# Patient Record
Sex: FEMALE | Race: WHITE | NOT HISPANIC OR LATINO | Employment: FULL TIME | URBAN - METROPOLITAN AREA
[De-identification: names, ages, dates, MRNs, and addresses within clinical notes are randomized per-mention and may not be internally consistent; named-entity substitution may affect disease eponyms.]

---

## 2023-02-23 ENCOUNTER — OFFICE VISIT (OUTPATIENT)
Dept: NEUROSURGERY | Facility: CLINIC | Age: 58
End: 2023-02-23

## 2023-02-23 ENCOUNTER — HOSPITAL ENCOUNTER (OUTPATIENT)
Dept: RADIOLOGY | Facility: HOSPITAL | Age: 58
Discharge: HOME | End: 2023-02-23
Attending: NEUROLOGICAL SURGERY

## 2023-02-23 VITALS
BODY MASS INDEX: 24.92 KG/M2 | WEIGHT: 132 LBS | HEIGHT: 61 IN | TEMPERATURE: 98.4 F | SYSTOLIC BLOOD PRESSURE: 110 MMHG | RESPIRATION RATE: 16 BRPM | HEART RATE: 78 BPM | DIASTOLIC BLOOD PRESSURE: 67 MMHG

## 2023-02-23 DIAGNOSIS — R07.81 RIB PAIN: ICD-10-CM

## 2023-02-23 DIAGNOSIS — M54.14 THORACIC RADICULOPATHY: ICD-10-CM

## 2023-02-23 DIAGNOSIS — M54.14 THORACIC RADICULOPATHY: Primary | ICD-10-CM

## 2023-02-23 RX ORDER — ROSUVASTATIN CALCIUM 5 MG/1
5 TABLET, COATED ORAL
COMMUNITY
Start: 2022-12-14

## 2023-02-23 RX ORDER — ZOLPIDEM TARTRATE 10 MG/1
10 TABLET ORAL DAILY PRN
COMMUNITY
Start: 2022-11-01

## 2023-02-23 RX ORDER — LISINOPRIL 10 MG/1
1 TABLET ORAL 2 TIMES DAILY
COMMUNITY
Start: 2022-09-27

## 2023-02-23 NOTE — PROGRESS NOTES
DISCUSSION SUMMARY  This is a 61 y o  female with a unprovoked pain in the thoracic spine with pain radiating from her back to her anterior chest   The pain has progressed over the last 2 to 3 months  The pain is without changes in color of her skin  She has never had skin eruptions in this region to suggest shingles  She does have a history of low back pain and sciatica she has used gabapentin which is not significantly improving the symptoms  The symptoms are mechanical in nature and that when she is standing the pain is less and when she is sitting the pain is much more severe  Yoga and stretching do seem to help the symptoms slightly  The pain can be reproduced by palpation over the rib at the lower thoracic spine  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 is for these reasons that I have recommended x-rays of the ribs as well as an MRI of the cervical spine  The exercises that she does and yoga are reminiscent of those that would have been done in physical therapy  Return for follow-up after test       Diagnosis ICD-10-CM Associated Orders   1  Thoracic radiculopathy  M54 14 MRI thoracic spine without contrast      2  Rib pain  R07 81            Chief Complaint   Patient presents with   • New Patient Visit     NP (self-referred) for sharp thoracic pain, no new imaging         HPI     Patient presents for evaluation of thoracic spine  • Symptoms include: mid (thoracic) back pain which is described as sharp and her pain travels towards the front to the abdomen and it becomes like a numbing pain  • She reports that symptoms have been present for several months  • Initial inciting event: none  • Symptoms are worst: end of the day (mainly 3-4 pm)  • Symptoms are relieved by rest, stretching and walking  • Symptoms are worsen by sitting  • Previous spinal problems: low back pain and sciatica pain but nothing significant     • Conservative measures tried: gabapentin (NEURONTIN) 300 mg Oral capsule take 1 Cap by mouth 3 times daily  Yoga and stretching every other day      • Prior surgery: none  Patient had prior imaging of her head neck and low back but never had study for her thoracic region to address this problem  Patient has developed pain in the thoracic spine which radiates anterior along the chest cage  She says that she is unable to sit because of the pain  She does have abdominal pain which appears to be coming from her back  She has numbness in this same distribution  She has tried gabapentin which was unhelpful for her  The pain does appear to be mechanical in nature  She has not noticed any changes of the skin coloration  She has had no skin eruptions in the region  She does feel that stretching and walking improves the symptomatology and sitting for long peers of time make it worse  She has tried yoga and stretching exercises which helped temporarily but the pain returns afterwards she does believe that the pain is increasing over the last several months    Review of Systems   Constitutional: Positive for activity change (unable to sit)  HENT: Negative  Eyes: Negative  Respiratory: Negative  Cardiovascular: Negative  Gastrointestinal: Positive for abdominal pain (coming from the mid back)  Endocrine: Negative  Genitourinary: Negative  Musculoskeletal: Positive for back pain (mid back)  Skin: Negative  Allergic/Immunologic: Negative  Neurological: Positive for numbness (in the abdomen with the pain that radiates from her back)  Hematological: Negative  Psychiatric/Behavioral: Negative  All other systems reviewed and are negative    I reviewed the ROS    Vitals:    /67 (BP Location: Right arm, Patient Position: Sitting, Cuff Size: Adult)   Pulse 78   Temp 98 4 °F (36 9 °C) (Temporal)   Resp 16   Ht 5' 1" (1 549 m)   Wt 59 9 kg (132 lb)   BMI 24 94 kg/m²     MEDICAL HISTORY  Past Medical History: Diagnosis Date   • Abdominal bloating    • Abnormal Pap smear of cervix    • Borderline hypertension    • Breast lipoma    • Dermatography    • GERD (gastroesophageal reflux disease)    • Gestational diabetes    • Hip pain    • HPV (human papilloma virus) anogenital infection    • Lumbar disc disease    • Undiagnosed cardiac murmurs      Past Surgical History:   Procedure Laterality Date   • ABDOMINOPLASTY     • BREAST BIOPSY Right    • BREAST LUMPECTOMY Right    •  SECTION     • HIP ARTHROPLASTY Left    • LIPOSUCTION Bilateral    • TOTAL HIP ARTHROPLASTY Left    • WISDOM TOOTH EXTRACTION       Social History     Tobacco Use   • Smoking status: Never   • Smokeless tobacco: Never   Substance Use Topics   • Alcohol use: Yes     Comment: social drinker   • Drug use: Never      Family History   Problem Relation Age of Onset   • Hypertension Mother    • Stroke Father    • Hypertension Father    • Ovarian cancer Paternal Aunt    • Heart disease Family         Current Outpatient Medications:   •  lisinopril (ZESTRIL) 10 mg tablet, Take 1 tablet by mouth 2 (two) times a day, Disp: , Rfl:   •  rosuvastatin (CRESTOR) 5 mg tablet, Take 5 mg by mouth, Disp: , Rfl:   •  zolpidem (AMBIEN) 10 mg tablet, Take 10 mg by mouth daily as needed, Disp: , Rfl:      No Known Allergies     The following portions of the patient's history were updated by MA and reviewed by MD: allergies, current medications, past family history, past medical history, past social history, past surgical history and problem list     Physical Exam  Vitals and nursing note reviewed  Constitutional:       General: She is not in acute distress  Appearance: Normal appearance  She is not ill-appearing, toxic-appearing or diaphoretic  HENT:      Head: Normocephalic  Nose: Nose normal    Eyes:      Extraocular Movements: Extraocular movements intact  Pupils: Pupils are equal, round, and reactive to light     Musculoskeletal:         General: No swelling, tenderness, deformity or signs of injury  Cervical back: Normal range of motion and neck supple  No rigidity  Right lower leg: No edema  Left lower leg: No edema  Comments: She has pain to palpation in the mid thoracic spine which recreates pain rating around her rib cage  Palpation of the rib cage also produces pain  There is no skin coloration or eruptions noted  Lymphadenopathy:      Cervical: No cervical adenopathy  Skin:     General: Skin is warm and dry  Coloration: Skin is not jaundiced  Findings: No erythema or rash  Neurological:      General: No focal deficit present  Mental Status: She is alert and oriented to person, place, and time  Cranial Nerves: No cranial nerve deficit  Sensory: No sensory deficit  Motor: No weakness  Coordination: Coordination normal       Gait: Gait normal       Deep Tendon Reflexes: Reflexes normal    Psychiatric:         Mood and Affect: Mood normal          Behavior: Behavior normal          Thought Content:  Thought content normal          Judgment: Judgment normal

## 2023-03-02 ENCOUNTER — TELEPHONE (OUTPATIENT)
Dept: NEUROSURGERY | Facility: CLINIC | Age: 58
End: 2023-03-02

## 2023-03-02 NOTE — TELEPHONE ENCOUNTER
3/2/23  SPOKE TO PT RE: MRI T SPINE DENIAL  NEEDS 6 WEEKS PHYSICAL THERAPY   SHE STATES SHE IS FEELING A LITTLE BETTER, DOING HER YOGA AND MAY SEE A CHIROPRACTOR  CX MRI ON PTS BEHALF ALONG WITH F/U WITH DKO  PT WILL CALL OFFICE IF NEEDED     PT WAS APPRECIATIVE OF THE CALL

## 2023-09-25 ENCOUNTER — OFFICE VISIT (OUTPATIENT)
Dept: INTERNAL MEDICINE CLINIC | Facility: CLINIC | Age: 58
End: 2023-09-25
Payer: COMMERCIAL

## 2023-09-25 VITALS
HEIGHT: 61 IN | BODY MASS INDEX: 24.92 KG/M2 | SYSTOLIC BLOOD PRESSURE: 125 MMHG | RESPIRATION RATE: 15 BRPM | WEIGHT: 132 LBS | DIASTOLIC BLOOD PRESSURE: 76 MMHG | HEART RATE: 77 BPM | OXYGEN SATURATION: 97 %

## 2023-09-25 DIAGNOSIS — R73.03 PREDIABETES: ICD-10-CM

## 2023-09-25 DIAGNOSIS — Z11.3 SCREENING EXAMINATION FOR STD (SEXUALLY TRANSMITTED DISEASE): ICD-10-CM

## 2023-09-25 DIAGNOSIS — E78.01 FAMILIAL HYPERCHOLESTEROLEMIA: Primary | ICD-10-CM

## 2023-09-25 DIAGNOSIS — Z12.31 ENCOUNTER FOR SCREENING MAMMOGRAM FOR MALIGNANT NEOPLASM OF BREAST: ICD-10-CM

## 2023-09-25 DIAGNOSIS — S90.811A ABRASION OF SKIN OF RIGHT FOOT: ICD-10-CM

## 2023-09-25 PROCEDURE — 90715 TDAP VACCINE 7 YRS/> IM: CPT

## 2023-09-25 PROCEDURE — 90471 IMMUNIZATION ADMIN: CPT

## 2023-09-25 PROCEDURE — 99203 OFFICE O/P NEW LOW 30 MIN: CPT | Performed by: INTERNAL MEDICINE

## 2023-09-25 RX ORDER — METHYLPREDNISOLONE 4 MG/1
TABLET ORAL
COMMUNITY
Start: 2023-06-27 | End: 2023-09-25 | Stop reason: ALTCHOICE

## 2023-09-25 NOTE — PROGRESS NOTES
Assessment/Plan:    #HTN  -BP stable on lisinopril  -has family history in parents  -will continue with low fat, low salt diet    #HLD  -lipids pending  -now on crestor  -is walking and doing yoga and keeping active    #Prediabetes  -previous a1c at 5.8  -has family history DM  -will trend a1c  -has been walking more and doing yoga    #Insomnia  -on ambien as needed and will continue  -has issues with staying asleep and waking up often  -reports snoring  -defers sleep study for now    #Thoracic Radiculopathy  -history of left sided rib and thoracic back pain  -xray ribs normal  -saw neurosurgery and stable  -treated with medrol dose and resolved  -holding off on MRI thoracic for now    #HPV Positive  -history of in the past  -will see gynecology  -is in a new relationship, will check STD panel    #Surgery  -left hip replacement and abdominoplasty, , and doing well    #Bunion  -noted over right foot  -has pain on occasion  -tries to wear comfortable footwear as much as possible    #Health Maintenance  -routine labs and followup 1 year  -is a pediatric anesthesiologist  -mammogram due next month  -gynecology due soon  -flu vaccine to obtain at work  -covid booster to obtain at pharmacy  -shingrix up to date  -TDAP today due to laceration to right foot  -colonoscopy due     No problem-specific Assessment & Plan notes found for this encounter.        Diagnoses and all orders for this visit:    Familial hypercholesterolemia  -     CBC and differential  -     Comprehensive metabolic panel  -     Lipid Panel with Direct LDL reflex    Prediabetes  -     CBC and differential  -     Comprehensive metabolic panel  -     Hemoglobin A1C    Screening examination for STD (sexually transmitted disease)  -     CBC and differential  -     Comprehensive metabolic panel  -     HIV 1/2 AB/AG w Reflex [facility] for 2 yr old and above  -     Chronic Hepatitis Panel  -     RPR-Syphilis Screening (Total Syphilis IGG/IGM)  - Chlamydia/GC amplified DNA by PCR    Abrasion of skin of right foot  -     CBC and differential  -     Comprehensive metabolic panel  -     TDAP VACCINE GREATER THAN OR EQUAL TO 8YO IM    Encounter for screening mammogram for malignant neoplasm of breast  -     Mammo screening bilateral w 3d & cad; Future    Other orders  -     Discontinue: methylPREDNISolone 4 MG tablet therapy pack            Current Outpatient Medications:   •  lisinopril (ZESTRIL) 10 mg tablet, Take 1 tablet by mouth 2 (two) times a day, Disp: , Rfl:   •  rosuvastatin (CRESTOR) 5 mg tablet, Take 5 mg by mouth, Disp: , Rfl:   •  zolpidem (AMBIEN) 10 mg tablet, Take 10 mg by mouth daily as needed, Disp: , Rfl:     Subjective:      Patient ID: Jes Tobin is a 60 y.o. female. HPI     Patient presents for new patient visit. Reports that she has a history of thoracic radiculopathy and chest pains however her x-ray was normal and her symptoms have resolved with a steroid pack. She is not having any issues at this time. She did follow-up with neurosurgery. An MRI was ordered but since her pain resolved she has deferred this. She is due for mammogram next month we gave her the referral.  She is also due for the Tdap vaccine. She has an abrasion to her right foot and we will provide her with the Tdap vaccine. She is in a new relationship and is interested in STD check. We will give her the paperwork for this. She is up-to-date on her colonoscopy in 2018 and will need a repeat in 10 years. She has a history of hyperlipidemia and remains on Crestor. She is also prediabetic and has been cutting back on carbohydrates and walking and doing yoga. She has a history of hypertension and remains on lisinopril. She has an abnormal Pap and follows up with gynecology and will see Dr. Shreyas Woodall soon. She drinks 1 glass of wine daily. She denies any smoking or drug use. She has insomnia and periodically uses Ambien.   She has a history of abdominoplasty as well as breast biopsy and  and a left hip replacement. Family history is positive for stroke and hypertension and hyperlipidemia and MI. She will return to care in 1 year. The following portions of the patient's history were reviewed and updated as appropriate: allergies, current medications, past family history, past medical history, past social history, past surgical history and problem list.    Review of Systems   Constitutional: Negative for activity change, appetite change, chills, fatigue and fever. HENT: Negative for congestion, ear pain, facial swelling, hearing loss, sore throat, tinnitus and trouble swallowing. Eyes: Negative for photophobia and visual disturbance. Respiratory: Negative for cough, shortness of breath and wheezing. Cardiovascular: Negative for chest pain, palpitations and leg swelling. Gastrointestinal: Negative for abdominal distention, abdominal pain, blood in stool, nausea and vomiting. Genitourinary: Negative for difficulty urinating, dysuria and pelvic pain. Musculoskeletal: Negative for arthralgias, back pain, gait problem, joint swelling, myalgias, neck pain and neck stiffness. Skin: Negative for rash and wound. Neurological: Negative for dizziness, tremors, light-headedness and headaches. Objective:      /76   Pulse 77   Resp 15   Ht 5' 1" (1.549 m)   Wt 59.9 kg (132 lb)   LMP  (LMP Unknown)   SpO2 97%   BMI 24.94 kg/m²          Physical Exam  Vitals reviewed. Constitutional:       Appearance: Normal appearance. She is well-developed. HENT:      Head: Normocephalic and atraumatic. Right Ear: Tympanic membrane, ear canal and external ear normal. There is no impacted cerumen. Left Ear: Tympanic membrane, ear canal and external ear normal. There is no impacted cerumen. Nose: Nose normal. No congestion. Mouth/Throat:      Pharynx: Oropharynx is clear.  No oropharyngeal exudate or posterior oropharyngeal erythema. Eyes:      Conjunctiva/sclera: Conjunctivae normal.      Pupils: Pupils are equal, round, and reactive to light. Neck:      Thyroid: No thyromegaly. Vascular: No JVD. Cardiovascular:      Rate and Rhythm: Normal rate and regular rhythm. Pulses: Normal pulses. Heart sounds: Normal heart sounds. No murmur heard. Pulmonary:      Effort: Pulmonary effort is normal. No respiratory distress. Breath sounds: Normal breath sounds. No stridor. No wheezing, rhonchi or rales. Abdominal:      General: Bowel sounds are normal. There is no distension. Palpations: Abdomen is soft. There is no mass. Tenderness: There is no abdominal tenderness. There is no right CVA tenderness, left CVA tenderness, guarding or rebound. Musculoskeletal:         General: Deformity (right knee scar, left hip replacement , right foot bunion) present. No tenderness. Normal range of motion. Cervical back: Normal range of motion and neck supple. No rigidity or tenderness. Right lower leg: No edema. Left lower leg: No edema. Lymphadenopathy:      Cervical: No cervical adenopathy. Skin:     General: Skin is warm and dry. Findings: No erythema or rash. Neurological:      Mental Status: She is alert and oriented to person, place, and time. Mental status is at baseline. Sensory: No sensory deficit. Deep Tendon Reflexes: Reflexes are normal and symmetric. Reflexes normal.   Psychiatric:         Mood and Affect: Mood normal.         Behavior: Behavior normal.         Thought Content: Thought content normal.         Judgment: Judgment normal.           This note was completed in part utilizing [employer] direct voice recognition software. Grammatical errors, random word insertion, spelling mistakes, and incomplete sentences may be an occasional consequence of the system secondary to software limitations, ambient noise and hardware issues.  At the time of dictation, efforts were made to edit, clarify and /or correct errors. Please read the chart carefully and recognize, using context, where substitutions have occurred. If you have any questions or concerns about the context, text or information contained within the body of this dictation, please contact myself, the provider, for further clarification.

## 2023-09-27 ENCOUNTER — APPOINTMENT (OUTPATIENT)
Dept: LAB | Facility: CLINIC | Age: 58
End: 2023-09-27
Payer: COMMERCIAL

## 2023-09-27 DIAGNOSIS — R73.03 PREDIABETES: ICD-10-CM

## 2023-09-27 DIAGNOSIS — E78.01 FAMILIAL HYPERCHOLESTEROLEMIA: Primary | ICD-10-CM

## 2023-09-27 LAB
ALBUMIN SERPL BCP-MCNC: 4.6 G/DL (ref 3.5–5)
ALP SERPL-CCNC: 53 U/L (ref 34–104)
ALT SERPL W P-5'-P-CCNC: 11 U/L (ref 7–52)
ANION GAP SERPL CALCULATED.3IONS-SCNC: 6 MMOL/L
AST SERPL W P-5'-P-CCNC: 14 U/L (ref 13–39)
BASOPHILS # BLD AUTO: 0.03 THOUSANDS/ÂΜL (ref 0–0.1)
BASOPHILS NFR BLD AUTO: 0 % (ref 0–1)
BILIRUB SERPL-MCNC: 0.42 MG/DL (ref 0.2–1)
BUN SERPL-MCNC: 13 MG/DL (ref 5–25)
CALCIUM SERPL-MCNC: 9.5 MG/DL (ref 8.4–10.2)
CHLORIDE SERPL-SCNC: 104 MMOL/L (ref 96–108)
CHOLEST SERPL-MCNC: 177 MG/DL
CO2 SERPL-SCNC: 28 MMOL/L (ref 21–32)
CREAT SERPL-MCNC: 0.71 MG/DL (ref 0.6–1.3)
EOSINOPHIL # BLD AUTO: 0.12 THOUSAND/ÂΜL (ref 0–0.61)
EOSINOPHIL NFR BLD AUTO: 2 % (ref 0–6)
ERYTHROCYTE [DISTWIDTH] IN BLOOD BY AUTOMATED COUNT: 15 % (ref 11.6–15.1)
EST. AVERAGE GLUCOSE BLD GHB EST-MCNC: 128 MG/DL
GFR SERPL CREATININE-BSD FRML MDRD: 93 ML/MIN/1.73SQ M
GLUCOSE P FAST SERPL-MCNC: 101 MG/DL (ref 65–99)
HBA1C MFR BLD: 6.1 %
HBV CORE AB SER QL: NORMAL
HBV CORE IGM SER QL: NORMAL
HBV SURFACE AG SER QL: NORMAL
HCT VFR BLD AUTO: 40.6 % (ref 34.8–46.1)
HCV AB SER QL: NORMAL
HDLC SERPL-MCNC: 58 MG/DL
HGB BLD-MCNC: 12.7 G/DL (ref 11.5–15.4)
HIV 1+2 AB+HIV1 P24 AG SERPL QL IA: NORMAL
HIV 2 AB SERPL QL IA: NORMAL
HIV1 AB SERPL QL IA: NORMAL
HIV1 P24 AG SERPL QL IA: NORMAL
IMM GRANULOCYTES # BLD AUTO: 0.02 THOUSAND/UL (ref 0–0.2)
IMM GRANULOCYTES NFR BLD AUTO: 0 % (ref 0–2)
LDLC SERPL CALC-MCNC: 95 MG/DL (ref 0–100)
LYMPHOCYTES # BLD AUTO: 2.73 THOUSANDS/ÂΜL (ref 0.6–4.47)
LYMPHOCYTES NFR BLD AUTO: 35 % (ref 14–44)
MCH RBC QN AUTO: 27.3 PG (ref 26.8–34.3)
MCHC RBC AUTO-ENTMCNC: 31.3 G/DL (ref 31.4–37.4)
MCV RBC AUTO: 87 FL (ref 82–98)
MONOCYTES # BLD AUTO: 0.66 THOUSAND/ÂΜL (ref 0.17–1.22)
MONOCYTES NFR BLD AUTO: 9 % (ref 4–12)
NEUTROPHILS # BLD AUTO: 4.17 THOUSANDS/ÂΜL (ref 1.85–7.62)
NEUTS SEG NFR BLD AUTO: 54 % (ref 43–75)
NRBC BLD AUTO-RTO: 0 /100 WBCS
PLATELET # BLD AUTO: 230 THOUSANDS/UL (ref 149–390)
PMV BLD AUTO: 10.4 FL (ref 8.9–12.7)
POTASSIUM SERPL-SCNC: 4 MMOL/L (ref 3.5–5.3)
PROT SERPL-MCNC: 7.1 G/DL (ref 6.4–8.4)
RBC # BLD AUTO: 4.65 MILLION/UL (ref 3.81–5.12)
SODIUM SERPL-SCNC: 138 MMOL/L (ref 135–147)
TREPONEMA PALLIDUM IGG+IGM AB [PRESENCE] IN SERUM OR PLASMA BY IMMUNOASSAY: NORMAL
TRIGL SERPL-MCNC: 118 MG/DL
WBC # BLD AUTO: 7.73 THOUSAND/UL (ref 4.31–10.16)

## 2023-09-27 PROCEDURE — 86803 HEPATITIS C AB TEST: CPT | Performed by: INTERNAL MEDICINE

## 2023-09-27 PROCEDURE — 87340 HEPATITIS B SURFACE AG IA: CPT | Performed by: INTERNAL MEDICINE

## 2023-09-27 PROCEDURE — 86705 HEP B CORE ANTIBODY IGM: CPT | Performed by: INTERNAL MEDICINE

## 2023-09-27 PROCEDURE — 85025 COMPLETE CBC W/AUTO DIFF WBC: CPT | Performed by: INTERNAL MEDICINE

## 2023-09-27 PROCEDURE — 80061 LIPID PANEL: CPT | Performed by: INTERNAL MEDICINE

## 2023-09-27 PROCEDURE — 36415 COLL VENOUS BLD VENIPUNCTURE: CPT | Performed by: INTERNAL MEDICINE

## 2023-09-27 PROCEDURE — 87389 HIV-1 AG W/HIV-1&-2 AB AG IA: CPT | Performed by: INTERNAL MEDICINE

## 2023-09-27 PROCEDURE — 80053 COMPREHEN METABOLIC PANEL: CPT | Performed by: INTERNAL MEDICINE

## 2023-09-27 PROCEDURE — 83036 HEMOGLOBIN GLYCOSYLATED A1C: CPT | Performed by: INTERNAL MEDICINE

## 2023-09-27 PROCEDURE — 86780 TREPONEMA PALLIDUM: CPT | Performed by: INTERNAL MEDICINE

## 2023-09-27 PROCEDURE — 87591 N.GONORRHOEAE DNA AMP PROB: CPT | Performed by: INTERNAL MEDICINE

## 2023-09-27 PROCEDURE — 86704 HEP B CORE ANTIBODY TOTAL: CPT | Performed by: INTERNAL MEDICINE

## 2023-09-27 PROCEDURE — 87491 CHLMYD TRACH DNA AMP PROBE: CPT | Performed by: INTERNAL MEDICINE

## 2023-09-28 LAB
C TRACH DNA SPEC QL NAA+PROBE: NEGATIVE
N GONORRHOEA DNA SPEC QL NAA+PROBE: NEGATIVE

## 2023-12-26 ENCOUNTER — ANNUAL EXAM (OUTPATIENT)
Dept: OBGYN CLINIC | Facility: CLINIC | Age: 58
End: 2023-12-26
Payer: COMMERCIAL

## 2023-12-26 VITALS
BODY MASS INDEX: 24.58 KG/M2 | DIASTOLIC BLOOD PRESSURE: 82 MMHG | SYSTOLIC BLOOD PRESSURE: 140 MMHG | HEIGHT: 61 IN | WEIGHT: 130.2 LBS

## 2023-12-26 DIAGNOSIS — Z01.419 ENCOUNTER FOR ANNUAL ROUTINE GYNECOLOGICAL EXAMINATION: Primary | ICD-10-CM

## 2023-12-26 DIAGNOSIS — Z12.31 ENCOUNTER FOR SCREENING MAMMOGRAM FOR MALIGNANT NEOPLASM OF BREAST: ICD-10-CM

## 2023-12-26 PROCEDURE — G0476 HPV COMBO ASSAY CA SCREEN: HCPCS | Performed by: OBSTETRICS & GYNECOLOGY

## 2023-12-26 PROCEDURE — S0610 ANNUAL GYNECOLOGICAL EXAMINA: HCPCS | Performed by: OBSTETRICS & GYNECOLOGY

## 2023-12-26 NOTE — PROGRESS NOTES
Assessment/Plan:  Pap smear done as well as annual.  Encouraged self breast examination as well as calcium supplementation.  Continue annual mammogram.  Reviewed colon cancer screening, up-to-date.  Discussed over-the-counter agents including vaginal moisturizers/lubricant.  All questions answered.  She will continue to follow-up with primary care as scheduled.  Return to office in 1 year or as needed  No problem-specific Assessment & Plan notes found for this encounter.       Diagnoses and all orders for this visit:    Encounter for annual routine gynecological examination  -     Liquid-based pap, screening    Encounter for screening mammogram for malignant neoplasm of breast          Subjective:      Patient ID: Michelle Cohn is a 58 y.o. female.    HPI    This is a very pleasant 58-year-old female P1 ( x 1, age 22) presents as a new patient for GYN exam.  Last GYN exam was approximately a year ago.  She went through menopause at age 49.  She is never been on hormone replacement therapy.  She rarely gets hot flashes/night sweats.  There has been no vaginal bleeding or spotting.  No changes in bowel or bladder function.  She is sexually active and has been in a monogamous relationship for 1.5 years.    Last Pap smear  normal, negative HPV.  She did have a history of cervical dysplasia WILBER-1 HPV where colposcopy was done .    Colonoscopy 2018 normal follow-up 10 years  Scheduled for mammogram    She continues to follow-up with primary care for hypertension and hyperlipidemia.    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    Review of Systems   Constitutional:  Negative for fatigue, fever and unexpected weight change.   Respiratory:  Negative for cough, chest  "tightness, shortness of breath and wheezing.    Cardiovascular: Negative.  Negative for chest pain and palpitations.   Gastrointestinal: Negative.  Negative for abdominal distention, abdominal pain, blood in stool, constipation, diarrhea, nausea and vomiting.   Genitourinary: Negative.  Negative for difficulty urinating, dyspareunia, dysuria, flank pain, frequency, genital sores, hematuria, pelvic pain, urgency, vaginal bleeding, vaginal discharge and vaginal pain.   Skin:  Negative for rash.         Objective:      /82   Ht 5' 1\" (1.549 m)   Wt 59.1 kg (130 lb 3.2 oz)   LMP  (LMP Unknown)   BMI 24.60 kg/m²          Physical Exam  Constitutional:       Appearance: Normal appearance. She is well-developed.   HENT:      Head: Normocephalic and atraumatic.   Cardiovascular:      Rate and Rhythm: Normal rate and regular rhythm.   Pulmonary:      Effort: Pulmonary effort is normal.      Breath sounds: Normal breath sounds.   Chest:   Breasts:     Right: No inverted nipple, mass, nipple discharge, skin change or tenderness.      Left: No inverted nipple, mass, nipple discharge, skin change or tenderness.   Abdominal:      General: Bowel sounds are normal. There is no distension.      Palpations: Abdomen is soft.      Tenderness: There is no abdominal tenderness. There is no guarding or rebound.   Genitourinary:     Labia:         Right: No rash, tenderness or lesion.         Left: No rash, tenderness or lesion.       Vagina: Normal. No signs of injury. No vaginal discharge or tenderness.      Cervix: No cervical motion tenderness, discharge, friability, lesion or cervical bleeding.      Uterus: Not enlarged and not tender.       Adnexa:         Right: No mass, tenderness or fullness.          Left: No mass, tenderness or fullness.     Neurological:      Mental Status: She is alert.   Psychiatric:         Behavior: Behavior normal.      Abdominal exam, history of abdominoplasty, primary   External " genitalia is within normal limits.  Is evident of slight estrogen deficiency.  Cervix is small the os is stenotic.  No pelvic floor prolapse.

## 2023-12-27 LAB
HPV HR 12 DNA CVX QL NAA+PROBE: NEGATIVE
HPV16 DNA CVX QL NAA+PROBE: NEGATIVE
HPV18 DNA CVX QL NAA+PROBE: NEGATIVE

## 2024-01-08 LAB
LAB AP GYN PRIMARY INTERPRETATION: NORMAL
Lab: NORMAL
PATH INTERP SPEC-IMP: NORMAL

## 2024-01-09 ENCOUNTER — TELEPHONE (OUTPATIENT)
Dept: OBGYN CLINIC | Facility: CLINIC | Age: 59
End: 2024-01-09

## 2024-01-09 DIAGNOSIS — N76.0 BV (BACTERIAL VAGINOSIS): Primary | ICD-10-CM

## 2024-01-09 DIAGNOSIS — B96.89 BV (BACTERIAL VAGINOSIS): Primary | ICD-10-CM

## 2024-01-09 RX ORDER — METRONIDAZOLE 500 MG/1
500 TABLET ORAL EVERY 12 HOURS SCHEDULED
Qty: 14 TABLET | Refills: 0 | Status: SHIPPED | OUTPATIENT
Start: 2024-01-09 | End: 2024-01-16

## 2024-01-09 NOTE — TELEPHONE ENCOUNTER
----- Message from Janette Breen DO sent at 1/9/2024  7:32 AM EST -----  Rhea, Please call the patient regarding her abnormal result. Patient is anesthesiologist on staff.  Please inform Pap normal but positive BV, recommend Flagyl twice daily x 7 days

## 2024-01-09 NOTE — TELEPHONE ENCOUNTER
Patient informed of pap results 12/26/2023 & recommendation to take Flagyl with precautions given/KA.  Please sign off on prescription for same to [facility] ([facility]),

## 2024-01-17 ENCOUNTER — HOSPITAL ENCOUNTER (OUTPATIENT)
Dept: RADIOLOGY | Age: 59
Discharge: HOME | End: 2024-01-17
Payer: COMMERCIAL

## 2024-01-17 DIAGNOSIS — Z12.31 ENCOUNTER FOR SCREENING MAMMOGRAM FOR MALIGNANT NEOPLASM OF BREAST: ICD-10-CM

## 2024-01-17 PROCEDURE — 77067 SCR MAMMO BI INCL CAD: CPT

## 2024-01-17 PROCEDURE — 77063 BREAST TOMOSYNTHESIS BI: CPT

## 2024-03-08 DIAGNOSIS — E78.01 FAMILIAL HYPERCHOLESTEROLEMIA: Primary | ICD-10-CM

## 2024-03-08 RX ORDER — ROSUVASTATIN CALCIUM 5 MG/1
5 TABLET, COATED ORAL DAILY
Qty: 90 TABLET | Refills: 0 | Status: SHIPPED | OUTPATIENT
Start: 2024-03-08

## 2024-03-12 ENCOUNTER — APPOINTMENT (OUTPATIENT)
Dept: LAB | Facility: CLINIC | Age: 59
End: 2024-03-12
Payer: COMMERCIAL

## 2024-03-12 DIAGNOSIS — E78.01 FAMILIAL HYPERCHOLESTEROLEMIA: ICD-10-CM

## 2024-03-12 DIAGNOSIS — R73.03 PREDIABETES: ICD-10-CM

## 2024-03-12 LAB
ALBUMIN SERPL BCP-MCNC: 4.9 G/DL (ref 3.5–5)
ALP SERPL-CCNC: 62 U/L (ref 34–104)
ALT SERPL W P-5'-P-CCNC: 15 U/L (ref 7–52)
ANION GAP SERPL CALCULATED.3IONS-SCNC: 10 MMOL/L (ref 4–13)
AST SERPL W P-5'-P-CCNC: 18 U/L (ref 13–39)
BASOPHILS # BLD AUTO: 0.02 THOUSANDS/ÂΜL (ref 0–0.1)
BASOPHILS NFR BLD AUTO: 0 % (ref 0–1)
BILIRUB SERPL-MCNC: 0.32 MG/DL (ref 0.2–1)
BUN SERPL-MCNC: 14 MG/DL (ref 5–25)
CALCIUM SERPL-MCNC: 9.5 MG/DL (ref 8.4–10.2)
CHLORIDE SERPL-SCNC: 105 MMOL/L (ref 96–108)
CHOLEST SERPL-MCNC: 185 MG/DL
CO2 SERPL-SCNC: 26 MMOL/L (ref 21–32)
CREAT SERPL-MCNC: 0.68 MG/DL (ref 0.6–1.3)
EOSINOPHIL # BLD AUTO: 0.01 THOUSAND/ÂΜL (ref 0–0.61)
EOSINOPHIL NFR BLD AUTO: 0 % (ref 0–6)
ERYTHROCYTE [DISTWIDTH] IN BLOOD BY AUTOMATED COUNT: 15.1 % (ref 11.6–15.1)
EST. AVERAGE GLUCOSE BLD GHB EST-MCNC: 128 MG/DL
GFR SERPL CREATININE-BSD FRML MDRD: 96 ML/MIN/1.73SQ M
GLUCOSE P FAST SERPL-MCNC: 95 MG/DL (ref 65–99)
HBA1C MFR BLD: 6.1 %
HCT VFR BLD AUTO: 41 % (ref 34.8–46.1)
HDLC SERPL-MCNC: 67 MG/DL
HGB BLD-MCNC: 13 G/DL (ref 11.5–15.4)
IMM GRANULOCYTES # BLD AUTO: 0.02 THOUSAND/UL (ref 0–0.2)
IMM GRANULOCYTES NFR BLD AUTO: 0 % (ref 0–2)
LDLC SERPL CALC-MCNC: 80 MG/DL (ref 0–100)
LYMPHOCYTES # BLD AUTO: 2.45 THOUSANDS/ÂΜL (ref 0.6–4.47)
LYMPHOCYTES NFR BLD AUTO: 32 % (ref 14–44)
MCH RBC QN AUTO: 27.7 PG (ref 26.8–34.3)
MCHC RBC AUTO-ENTMCNC: 31.7 G/DL (ref 31.4–37.4)
MCV RBC AUTO: 87 FL (ref 82–98)
MONOCYTES # BLD AUTO: 0.52 THOUSAND/ÂΜL (ref 0.17–1.22)
MONOCYTES NFR BLD AUTO: 7 % (ref 4–12)
NEUTROPHILS # BLD AUTO: 4.56 THOUSANDS/ÂΜL (ref 1.85–7.62)
NEUTS SEG NFR BLD AUTO: 61 % (ref 43–75)
NRBC BLD AUTO-RTO: 0 /100 WBCS
PLATELET # BLD AUTO: 251 THOUSANDS/UL (ref 149–390)
PMV BLD AUTO: 10.4 FL (ref 8.9–12.7)
POTASSIUM SERPL-SCNC: 3.9 MMOL/L (ref 3.5–5.3)
PROT SERPL-MCNC: 7.2 G/DL (ref 6.4–8.4)
RBC # BLD AUTO: 4.69 MILLION/UL (ref 3.81–5.12)
SODIUM SERPL-SCNC: 141 MMOL/L (ref 135–147)
TRIGL SERPL-MCNC: 189 MG/DL
WBC # BLD AUTO: 7.58 THOUSAND/UL (ref 4.31–10.16)

## 2024-03-12 PROCEDURE — 85025 COMPLETE CBC W/AUTO DIFF WBC: CPT

## 2024-03-12 PROCEDURE — 36415 COLL VENOUS BLD VENIPUNCTURE: CPT

## 2024-03-12 PROCEDURE — 80061 LIPID PANEL: CPT

## 2024-03-12 PROCEDURE — 80053 COMPREHEN METABOLIC PANEL: CPT

## 2024-03-12 PROCEDURE — 83036 HEMOGLOBIN GLYCOSYLATED A1C: CPT

## 2024-03-15 ENCOUNTER — TELEPHONE (OUTPATIENT)
Dept: INTERNAL MEDICINE CLINIC | Facility: CLINIC | Age: 59
End: 2024-03-15

## 2024-03-15 NOTE — TELEPHONE ENCOUNTER
----- Message from Liv Rodriguez MD sent at 3/15/2024  9:01 AM EDT -----  Please call the patient regarding her abnormal result.  Cut down starches and sweets; froy that trigs are higher. A1c mild elev, but stable. ty

## 2024-05-27 DIAGNOSIS — E78.01 FAMILIAL HYPERCHOLESTEROLEMIA: ICD-10-CM

## 2024-05-28 RX ORDER — ROSUVASTATIN CALCIUM 5 MG/1
5 TABLET, COATED ORAL DAILY
Qty: 90 TABLET | Refills: 0 | Status: SHIPPED | OUTPATIENT
Start: 2024-05-28

## 2024-08-12 ENCOUNTER — OFFICE VISIT (OUTPATIENT)
Dept: INTERNAL MEDICINE CLINIC | Facility: CLINIC | Age: 59
End: 2024-08-12
Payer: COMMERCIAL

## 2024-08-12 VITALS
HEART RATE: 68 BPM | OXYGEN SATURATION: 94 % | BODY MASS INDEX: 24.55 KG/M2 | SYSTOLIC BLOOD PRESSURE: 118 MMHG | TEMPERATURE: 98.4 F | RESPIRATION RATE: 16 BRPM | DIASTOLIC BLOOD PRESSURE: 78 MMHG | HEIGHT: 61 IN | WEIGHT: 130 LBS

## 2024-08-12 DIAGNOSIS — F51.01 PRIMARY INSOMNIA: ICD-10-CM

## 2024-08-12 DIAGNOSIS — Z12.11 COLON CANCER SCREENING: Primary | ICD-10-CM

## 2024-08-12 DIAGNOSIS — M25.551 LATERAL PAIN OF RIGHT HIP: ICD-10-CM

## 2024-08-12 DIAGNOSIS — Z00.00 HEALTHCARE MAINTENANCE: ICD-10-CM

## 2024-08-12 DIAGNOSIS — R73.9 HYPERGLYCEMIA: ICD-10-CM

## 2024-08-12 DIAGNOSIS — E78.01 FAMILIAL HYPERCHOLESTEROLEMIA: ICD-10-CM

## 2024-08-12 DIAGNOSIS — I10 PRIMARY HYPERTENSION: ICD-10-CM

## 2024-08-12 PROCEDURE — 99215 OFFICE O/P EST HI 40 MIN: CPT | Performed by: INTERNAL MEDICINE

## 2024-08-12 RX ORDER — ROSUVASTATIN CALCIUM 5 MG/1
5 TABLET, COATED ORAL DAILY
Qty: 90 TABLET | Refills: 0 | Status: SHIPPED | OUTPATIENT
Start: 2024-08-12

## 2024-08-12 RX ORDER — LISINOPRIL 10 MG/1
10 TABLET ORAL DAILY
Qty: 90 TABLET | Refills: 3 | Status: SHIPPED | OUTPATIENT
Start: 2024-08-12

## 2024-08-12 RX ORDER — MINOXIDIL 2.5 MG/1
1.5 TABLET ORAL DAILY
COMMUNITY

## 2024-08-12 RX ORDER — ZOLPIDEM TARTRATE 10 MG/1
10 TABLET ORAL DAILY PRN
Qty: 30 TABLET | Refills: 3 | Status: SHIPPED | OUTPATIENT
Start: 2024-08-12

## 2024-08-12 NOTE — ASSESSMENT & PLAN NOTE
Blood pressure is well-controlled.  Patient is compliant with medication.  Will continue to adjust medication as needed and check on her renal function also with her next visit.

## 2024-08-12 NOTE — ASSESSMENT & PLAN NOTE
History of hyperglycemia but not overt diabetes.  Hemoglobin A1c has been consistent about 6.1 again knows to watch her diet and she states that she remains physically active although her activity level is restricted somewhat because of right hip pain

## 2024-08-12 NOTE — PROGRESS NOTES
Ambulatory Visit  Name: Michelle Cohn      : 1965      MRN: [id number]  Encounter Provider: Vickey Christie DO  Encounter Date: 2024   Encounter department: [facility] INTERNAL MEDICINE    Assessment & Plan   1. Colon cancer screening  -     Ambulatory Referral to Colorectal Surgery; Future  2. Hyperglycemia  Assessment & Plan:  History of hyperglycemia but not overt diabetes.  Hemoglobin A1c has been consistent about 6.1 again knows to watch her diet and she states that she remains physically active although her activity level is restricted somewhat because of right hip pain  Orders:  -     Hemoglobin A1C; Future  3. Healthcare maintenance  Assessment & Plan:  Patient is a 59-year-old female history of multiple medical problems including hypertension, hyperglycemia, family history of hypercholesterolemia.  Patient is here today to establish care in our medical practice states that her previous physician has no moved to a different medical plan in [place].  Relates that her biggest complaint is some pain her right hip.  Patient is status post total hip replacement on the left previously.  Discomfort with movement no recent studies.  She is retired from a job as an anesthesiologist, pediatrics.  We did review labs that were obtained previously having relatively good control    We have scheduled the patient for physical in the future and arranged for labs to be performed prior to that visit    Consult and evaluation by orthopedics and x-ray of her right hip.  Renewal of all medication was made.  Arrange for the patient to have routine colonoscopy performed  Orders:  -     Comprehensive metabolic panel; Future; Expected date: 2024  -     CBC and differential; Future; Expected date: 2024  -     Lipid panel; Future; Expected date: 2024  -     Urinalysis with microscopic; Future  -     Hemoglobin A1C; Future  4. Primary hypertension  Assessment & Plan:  Blood pressure is  well-controlled.  Patient is compliant with medication.  Will continue to adjust medication as needed and check on her renal function also with her next visit.  Orders:  -     lisinopril (ZESTRIL) 10 mg tablet; Take 1 tablet (10 mg total) by mouth daily  5. Familial hypercholesterolemia  Assessment & Plan:  Relates that she is compliant with her diet and medication.  Her cholesterol has been well-controlled.  Again this is a familial problem and we will make adjustment of medication treatment in the future as needed.  She knows to watch her diet and restrict her intake of fats and cholesterol.  Orders:  -     rosuvastatin (CRESTOR) 5 mg tablet; Take 1 tablet (5 mg total) by mouth daily  6. Lateral pain of right hip  Assessment & Plan:  Pain with maneuvers mostly the greater trochanter but not to the groin.  Will obtain x-ray of her hip and pelvis and we have arranged for the patient to see one of the local orthopedic physicians  Orders:  -     XR hip/pelv 2-3 vws right if performed; Future; Expected date: 08/12/2024  -     Ambulatory Referral to Orthopedic Surgery; Future  7. Primary insomnia  -     zolpidem (AMBIEN) 10 mg tablet; Take 1 tablet (10 mg total) by mouth daily as needed for sleep         History of Present Illness     Patient is a 59-year-old female with a history of medical problems outlined in her chart who is here to establish care in our medical practice.  Patient states that she is doing relatively well but knows that she is not up-to-date with all routine testing and also is complaining of some pain in the lateral portion of her hip on the right      Review of Systems   Constitutional:  Positive for activity change. Negative for appetite change, chills, diaphoresis, fatigue, fever and unexpected weight change.        Some restriction in activity level secondary to hip pain   HENT: Negative.     Eyes: Negative.  Negative for visual disturbance.   Cardiovascular: Negative.    Gastrointestinal:  Negative.    Endocrine: Negative.    Genitourinary: Negative.    Musculoskeletal: Negative.    Skin: Negative.    Allergic/Immunologic: Negative.    Neurological: Negative.    Hematological: Negative.    Psychiatric/Behavioral: Negative.       Past Medical History:   Diagnosis Date    Abdominal bloating     Abnormal Pap smear of cervix     Borderline hypertension     Breast lipoma     Dermatography     GERD (gastroesophageal reflux disease)     Gestational diabetes     Hip pain     HPV (human papilloma virus) anogenital infection     Hypertension     Lumbar disc disease     Undiagnosed cardiac murmurs      Past Surgical History:   Procedure Laterality Date    ABDOMINOPLASTY      BREAST BIOPSY Right     BREAST LUMPECTOMY Right      SECTION      HIP ARTHROPLASTY Left     LIPOSUCTION Bilateral     TOTAL HIP ARTHROPLASTY Left     WISDOM TOOTH EXTRACTION       Family History   Problem Relation Age of Onset    Hypertension Mother     Stroke Father     Hypertension Father     No Known Problems Sister     No Known Problems Daughter     Ovarian cancer Paternal Aunt 45    Heart disease Family      Social History     Tobacco Use    Smoking status: Never    Smokeless tobacco: Never   Vaping Use    Vaping status: Never Used   Substance and Sexual Activity    Alcohol use: Yes     Alcohol/week: 4.0 standard drinks of alcohol     Types: 4 Glasses of wine per week     Comment: social drinker    Drug use: Never    Sexual activity: Yes     Partners: Male     Birth control/protection: Condom Male     Current Outpatient Medications on File Prior to Visit   Medication Sig    minoxidil (LONITEN) 2.5 mg tablet Take 1.5 mg by mouth daily    [DISCONTINUED] lisinopril (ZESTRIL) 10 mg tablet Take 1 tablet by mouth daily    [DISCONTINUED] rosuvastatin (CRESTOR) 5 mg tablet TAKE 1 TABLET BY MOUTH EVERY DAY    [DISCONTINUED] zolpidem (AMBIEN) 10 mg tablet Take 10 mg by mouth daily as needed     No Known Allergies  Immunization History  "  Administered Date(s) Administered    COVID-19 PFIZER VACCINE 0.3 ML IM 12/22/2020, 01/08/2021, 09/27/2021, 05/29/2022    COVID-19 Pfizer vac (Andi-sucrose, gray cap) 12 yr+ IM 05/24/2022    Fluzone Split Quad 0.25 mL 09/08/2020, 10/11/2022    INFLUENZA 09/27/2021    Tdap 09/25/2023    Tuberculin Skin Test-PPD Intradermal 07/28/2021    Zoster Vaccine Recombinant 09/15/2020, 12/02/2020     Objective     /78   Pulse 68   Temp 98.4 °F (36.9 °C)   Resp 16   Ht 5' 1\" (1.549 m)   Wt 59 kg (130 lb)   LMP  (LMP Unknown)   SpO2 94%   BMI 24.56 kg/m²     Physical Exam  Vitals and nursing note reviewed.   Constitutional:       General: She is not in acute distress.     Appearance: Normal appearance. She is normal weight. She is not ill-appearing, toxic-appearing or diaphoretic.      Comments: Pleasant articulate 59-year-old female who is awake alert in no acute distress oriented x 3   HENT:      Head: Normocephalic and atraumatic.      Right Ear: Tympanic membrane, ear canal and external ear normal. There is no impacted cerumen.      Left Ear: Tympanic membrane, ear canal and external ear normal. There is no impacted cerumen.      Nose: Nose normal. No congestion or rhinorrhea.      Mouth/Throat:      Mouth: Mucous membranes are moist.      Pharynx: Oropharynx is clear. No oropharyngeal exudate or posterior oropharyngeal erythema.   Eyes:      General: No scleral icterus.        Right eye: No discharge.         Left eye: No discharge.      Extraocular Movements: Extraocular movements intact.      Conjunctiva/sclera: Conjunctivae normal.      Pupils: Pupils are equal, round, and reactive to light.   Neck:      Vascular: No carotid bruit.   Cardiovascular:      Rate and Rhythm: Normal rate and regular rhythm.      Pulses: Normal pulses.      Heart sounds: Normal heart sounds. No murmur heard.     No friction rub. No gallop.   Pulmonary:      Effort: Pulmonary effort is normal. No respiratory distress.      Breath " sounds: Normal breath sounds. No stridor. No wheezing, rhonchi or rales.   Chest:      Chest wall: No tenderness.   Abdominal:      General: Abdomen is flat. Bowel sounds are normal. There is no distension.      Palpations: Abdomen is soft. There is no mass.      Tenderness: There is no abdominal tenderness. There is no right CVA tenderness, left CVA tenderness, guarding or rebound.      Hernia: No hernia is present.   Musculoskeletal:         General: Tenderness present. No swelling, deformity or signs of injury. Normal range of motion.      Cervical back: Normal range of motion and neck supple. No rigidity or tenderness.      Right lower leg: No edema.      Left lower leg: No edema.      Comments: With range of motion to her right hip does have some discomfort with maneuvers internal/external rotation of the hip.  Pain is mostly to the area of the greater trochanter on the right   Lymphadenopathy:      Cervical: No cervical adenopathy.   Skin:     General: Skin is warm and dry.      Capillary Refill: Capillary refill takes less than 2 seconds.      Coloration: Skin is not jaundiced or pale.      Findings: No bruising, erythema, lesion or rash.   Neurological:      General: No focal deficit present.      Mental Status: She is alert and oriented to person, place, and time. Mental status is at baseline.      Cranial Nerves: No cranial nerve deficit.      Sensory: No sensory deficit.      Motor: No weakness.      Coordination: Coordination normal.      Gait: Gait normal.      Deep Tendon Reflexes: Reflexes normal.   Psychiatric:         Mood and Affect: Mood normal.         Behavior: Behavior normal.         Thought Content: Thought content normal.         Judgment: Judgment normal.

## 2024-08-12 NOTE — ASSESSMENT & PLAN NOTE
Relates that she is compliant with her diet and medication.  Her cholesterol has been well-controlled.  Again this is a familial problem and we will make adjustment of medication treatment in the future as needed.  She knows to watch her diet and restrict her intake of fats and cholesterol.

## 2024-08-12 NOTE — ASSESSMENT & PLAN NOTE
Patient is a 59-year-old female history of multiple medical problems including hypertension, hyperglycemia, family history of hypercholesterolemia.  Patient is here today to establish care in our medical practice states that her previous physician has no moved to a different medical plan in [place].  Relates that her biggest complaint is some pain her right hip.  Patient is status post total hip replacement on the left previously.  Discomfort with movement no recent studies.  She is retired from a job as an anesthesiologist, pediatrics.  We did review labs that were obtained previously having relatively good control    We have scheduled the patient for physical in the future and arranged for labs to be performed prior to that visit    Consult and evaluation by orthopedics and x-ray of her right hip.  Renewal of all medication was made.  Arrange for the patient to have routine colonoscopy performed

## 2024-08-12 NOTE — ASSESSMENT & PLAN NOTE
Pain with maneuvers mostly the greater trochanter but not to the groin.  Will obtain x-ray of her hip and pelvis and we have arranged for the patient to see one of the local orthopedic physicians

## 2024-08-27 ENCOUNTER — HOSPITAL ENCOUNTER (OUTPATIENT)
Dept: RADIOLOGY | Facility: HOSPITAL | Age: 59
Discharge: HOME | End: 2024-08-27
Payer: COMMERCIAL

## 2024-08-27 DIAGNOSIS — M25.551 LATERAL PAIN OF RIGHT HIP: ICD-10-CM

## 2024-08-27 PROCEDURE — 73502 X-RAY EXAM HIP UNI 2-3 VIEWS: CPT

## 2024-08-29 ENCOUNTER — OFFICE VISIT (OUTPATIENT)
Dept: OBGYN CLINIC | Facility: HOSPITAL | Age: 59
End: 2024-08-29
Payer: COMMERCIAL

## 2024-08-29 VITALS
HEART RATE: 75 BPM | SYSTOLIC BLOOD PRESSURE: 134 MMHG | HEIGHT: 61 IN | DIASTOLIC BLOOD PRESSURE: 89 MMHG | WEIGHT: 132 LBS | BODY MASS INDEX: 24.92 KG/M2

## 2024-08-29 DIAGNOSIS — M16.11 PRIMARY OSTEOARTHRITIS OF ONE HIP, RIGHT: Primary | ICD-10-CM

## 2024-08-29 DIAGNOSIS — M70.61 GREATER TROCHANTERIC BURSITIS OF RIGHT HIP: ICD-10-CM

## 2024-08-29 DIAGNOSIS — M25.551 LATERAL PAIN OF RIGHT HIP: ICD-10-CM

## 2024-08-29 PROCEDURE — 20610 DRAIN/INJ JOINT/BURSA W/O US: CPT | Performed by: ORTHOPAEDIC SURGERY

## 2024-08-29 PROCEDURE — 99204 OFFICE O/P NEW MOD 45 MIN: CPT | Performed by: ORTHOPAEDIC SURGERY

## 2024-08-29 RX ORDER — LIDOCAINE HYDROCHLORIDE 10 MG/ML
2 INJECTION, SOLUTION EPIDURAL; INFILTRATION; INTRACAUDAL; PERINEURAL
Status: COMPLETED | OUTPATIENT
Start: 2024-08-29 | End: 2024-08-29

## 2024-08-29 RX ORDER — METHYLPREDNISOLONE ACETATE 40 MG/ML
2 INJECTION, SUSPENSION INTRA-ARTICULAR; INTRALESIONAL; INTRAMUSCULAR; SOFT TISSUE
Status: COMPLETED | OUTPATIENT
Start: 2024-08-29 | End: 2024-08-29

## 2024-08-29 RX ADMIN — METHYLPREDNISOLONE ACETATE 2 ML: 40 INJECTION, SUSPENSION INTRA-ARTICULAR; INTRALESIONAL; INTRAMUSCULAR; SOFT TISSUE at 16:15

## 2024-08-29 RX ADMIN — LIDOCAINE HYDROCHLORIDE 2 ML: 10 INJECTION, SOLUTION EPIDURAL; INFILTRATION; INTRACAUDAL; PERINEURAL at 16:15

## 2024-08-29 NOTE — PROGRESS NOTES
Assessment:   Diagnosis ICD-10-CM Associated Orders   1. Primary osteoarthritis of one hip, right  M16.11 FL injection right hip (non arthrogram)     Ambulatory Referral to Orthopedic Surgery      2. Lateral pain of right hip  M25.551 Ambulatory Referral to Orthopedic Surgery     FL injection right hip (non arthrogram)      3. Greater trochanteric bursitis of right hip  M70.61           Plan:  59 y.o. female with right hip osteoarthritis and right hip greater trochanteric bursitis  Patient educated on the etiology of her right hip pain  Operative and nonoperative management of osteoarthritis discussed with patient, including corticosteroid injection, physical therapy, activity modification, weight loss  Quality of life decision is to pursue a total hip arthroplasty, patient would prefer the anterior approach  Patient has a trip scheduled in October and November, would like a corticosteroid injection into the hip joint to get her through her medications prior to scheduling hip arthroplasty  Prescription provided for patient to receive a right-sided intra-articular steroid injection under fluoroscopy with radiology team  As patient prefers anterior hip arthroplasty, referral provided to Dr. Simon for further management and care  For patient's lateral sided pain and tenderness to palpation focally, a diagnosis of greater trochanteric bursitis was made, patient offered and accepted a corticosteroid injection to her greater trochanteric bursa  Pain scale today 8/10  Ice and elevate as needed  Follow-up on an as-needed basis    The above stated was discussed in layman's terms and the patient expressed understanding.  All questions were answered to the patient's satisfaction.     To do next visit:  Follow-up on an as-needed basis      Subjective:   Michelle Cohn is a 59 y.o. female who presents for initial evaluation of right hip pain.  Patient has a prior surgical history of left hip total replacement done at [facility]  [place] approximately 4 years ago which was done via an anterior approach which the patient is very happy with.  Patient notes multiple year history of right-sided hip pain that is worse when she has extensive weightbearing, prevents her from walking long distances, tying her shoes, getting in and out of cars.  She also has focal tenderness over the lateral side of her hip.  She reports her pain is modified by oral NSAIDs.  She denies any trauma to her hip, no numbness or tingling.      Review of systems negative unless otherwise specified in HPI    Past Medical History:   Diagnosis Date    Abdominal bloating     Abnormal Pap smear of cervix     Borderline hypertension     Breast lipoma     Dermatography     GERD (gastroesophageal reflux disease)     Gestational diabetes     Hip pain     HPV (human papilloma virus) anogenital infection     Hypertension     Lumbar disc disease     Undiagnosed cardiac murmurs        Past Surgical History:   Procedure Laterality Date    ABDOMINOPLASTY      BREAST BIOPSY Right     BREAST LUMPECTOMY Right      SECTION      HIP ARTHROPLASTY Left     LIPOSUCTION Bilateral     TOTAL HIP ARTHROPLASTY Left     WISDOM TOOTH EXTRACTION         Family History   Problem Relation Age of Onset    Hypertension Mother     Stroke Father     Hypertension Father     No Known Problems Sister     No Known Problems Daughter     Ovarian cancer Paternal Aunt 45    Heart disease Family        Social History     Occupational History    Not on file   Tobacco Use    Smoking status: Never    Smokeless tobacco: Never   Vaping Use    Vaping status: Never Used   Substance and Sexual Activity    Alcohol use: Yes     Alcohol/week: 4.0 standard drinks of alcohol     Types: 4 Glasses of wine per week     Comment: social drinker    Drug use: Never    Sexual activity: Yes     Partners: Male     Birth control/protection: Condom Male         Current Outpatient Medications:     lisinopril (ZESTRIL) 10 mg tablet,  "Take 1 tablet (10 mg total) by mouth daily, Disp: 90 tablet, Rfl: 3    minoxidil (LONITEN) 2.5 mg tablet, Take 1.5 mg by mouth daily, Disp: , Rfl:     rosuvastatin (CRESTOR) 5 mg tablet, Take 1 tablet (5 mg total) by mouth daily, Disp: 90 tablet, Rfl: 0    zolpidem (AMBIEN) 10 mg tablet, Take 1 tablet (10 mg total) by mouth daily as needed for sleep, Disp: 30 tablet, Rfl: 3    No Known Allergies         Vitals:    08/29/24 1550   BP: 134/89   Pulse: 75       Objective:  Physical exam  General: Awake, Alert, Oriented  Eyes: Pupils equal, round and reactive to light  Heart: regular rate and rhythm  Lungs: No audible wheezing  Abdomen: soft    Examination of the the right hip shows tenderness to palpation over the greater trochanteric bursa, hip flexion, internal rotation and external rotation elicit pain and restriction to motion, intact extensor mechanism, intact ankle dorsiflexion, sensation intact to light touch distally    Diagnostics, reviewed and taken today if performed as documented:    X-rays of the right hip show loss of sphericity of the right femoral head, sclerosis over the superior acetabular rim, greater than 50% joint space narrowing of the hip joint suggestive of right hip osteoarthritis      Procedures, if performed today:    Large joint arthrocentesis: R greater trochanteric bursa  Universal Protocol:  Consent: Verbal consent obtained.  Risks and benefits: risks, benefits and alternatives were discussed  Consent given by: patient  Time out: Immediately prior to procedure a \"time out\" was called to verify the correct patient, procedure, equipment, support staff and site/side marked as required.  Timeout called at: 8/29/2024 4:49 PM.  Patient understanding: patient states understanding of the procedure being performed  Site marked: the operative site was marked  Patient identity confirmed: verbally with patient  Supporting Documentation  Indications: pain   Procedure Details  Location: hip - R greater " "trochanteric bursa  Needle size: 22 G  Ultrasound guidance: no  Approach: lateral  Medications administered: 2 mL lidocaine (PF) 1 %; 2 mL methylPREDNISolone acetate 40 mg/mL    Patient tolerance: patient tolerated the procedure well with no immediate complications  Dressing:  Sterile dressing applied        Portions of the record may have been created with voice recognition software.  Occasional wrong word or \"sound a like\" substitutions may have occurred due to the inherent limitations of voice recognition software.  Read the chart carefully and recognize, using context, where substitutions have occurred.        "

## 2024-08-30 ENCOUNTER — TELEPHONE (OUTPATIENT)
Age: 59
End: 2024-08-30

## 2024-08-30 NOTE — TELEPHONE ENCOUNTER
Scheduled date of colonoscopy (as of today): 11-  Physician performing colonoscopy: Dr. Hernandez  [facility] of colon[facility]: [facility]   Bowel prep reviewed with patient: dulcolax and miralax reviewed and sent via [employer] [facility]   Instructions reviewed with patient by: [email or web address]   Clearances: n/a

## 2024-08-30 NOTE — TELEPHONE ENCOUNTER
08/30/24  Screened by: Milly Campbell MA    Referring Provider Dr. Christie    Pre- Screening:     There is no height or weight on file to calculate BMI. 24.94  Has patient been referred for a routine screening Colonoscopy? yes  Is the patient between 45-75 years old? yes      Previous Colonoscopy yes   If yes:    Date: 10 years ago    Facility: in [place]    Reason: normal      SCHEDULING STAFF: If the patient is between 45yrs-49yrs, please advise patient to confirm benefits/coverage with their insurance company for a routine screening colonoscopy, some insurance carriers will only cover at 50yrs or older. If the patient is over 75years old, please schedule an office visit.     Does the patient want to see a Gastroenterologist prior to their procedure OR are they having any GI symptoms? no    Has the patient been hospitalized or had abdominal surgery in the past 6 months? no    Does the patient use supplemental oxygen? no    Does the patient take Coumadin, Lovenox, Plavix, Elliquis, Xarelto, or other blood thinning medication? no    Has the patient had a stroke, cardiac event, or stent placed in the past year? no    SCHEDULING STAFF: If patient answers NO to above questions, then schedule procedure. If patient answers YES to above questions, then schedule office appointment.     If patient is between 45yrs - 49yrs, please advise patient that we will have to confirm benefits & coverage with their insurance company for a routine screening colonoscopy.

## 2024-09-11 PROBLEM — Z00.00 HEALTHCARE MAINTENANCE: Status: RESOLVED | Noted: 2024-08-12 | Resolved: 2024-09-11

## 2024-10-31 DIAGNOSIS — E78.01 FAMILIAL HYPERCHOLESTEROLEMIA: ICD-10-CM

## 2024-10-31 RX ORDER — ROSUVASTATIN CALCIUM 5 MG/1
5 TABLET, COATED ORAL DAILY
Qty: 90 TABLET | Refills: 1 | Status: SHIPPED | OUTPATIENT
Start: 2024-10-31

## 2024-11-04 ENCOUNTER — ANESTHESIA EVENT (OUTPATIENT)
Dept: ANESTHESIOLOGY | Facility: HOSPITAL | Age: 59
End: 2024-11-04

## 2024-11-04 ENCOUNTER — ANESTHESIA (OUTPATIENT)
Dept: ANESTHESIOLOGY | Facility: HOSPITAL | Age: 59
End: 2024-11-04

## 2024-11-11 ENCOUNTER — APPOINTMENT (OUTPATIENT)
Dept: LAB | Facility: CLINIC | Age: 59
End: 2024-11-11
Payer: COMMERCIAL

## 2024-11-11 DIAGNOSIS — Z00.00 HEALTHCARE MAINTENANCE: ICD-10-CM

## 2024-11-11 DIAGNOSIS — R73.9 HYPERGLYCEMIA: ICD-10-CM

## 2024-11-11 LAB
ALBUMIN SERPL BCG-MCNC: 4.5 G/DL (ref 3.5–5)
ALP SERPL-CCNC: 51 U/L (ref 34–104)
ALT SERPL W P-5'-P-CCNC: 33 U/L (ref 7–52)
ANION GAP SERPL CALCULATED.3IONS-SCNC: 13 MMOL/L (ref 4–13)
AST SERPL W P-5'-P-CCNC: 28 U/L (ref 13–39)
BACTERIA UR QL AUTO: ABNORMAL /HPF
BASOPHILS # BLD AUTO: 0.02 THOUSANDS/ÂΜL (ref 0–0.1)
BASOPHILS NFR BLD AUTO: 0 % (ref 0–1)
BILIRUB SERPL-MCNC: 0.38 MG/DL (ref 0.2–1)
BILIRUB UR QL STRIP: NEGATIVE
BUN SERPL-MCNC: 10 MG/DL (ref 5–25)
CALCIUM SERPL-MCNC: 9.1 MG/DL (ref 8.4–10.2)
CHLORIDE SERPL-SCNC: 104 MMOL/L (ref 96–108)
CHOLEST SERPL-MCNC: 138 MG/DL
CLARITY UR: CLEAR
CO2 SERPL-SCNC: 20 MMOL/L (ref 21–32)
COLOR UR: YELLOW
CREAT SERPL-MCNC: 0.55 MG/DL (ref 0.6–1.3)
EOSINOPHIL # BLD AUTO: 0.09 THOUSAND/ÂΜL (ref 0–0.61)
EOSINOPHIL NFR BLD AUTO: 1 % (ref 0–6)
ERYTHROCYTE [DISTWIDTH] IN BLOOD BY AUTOMATED COUNT: 14.6 % (ref 11.6–15.1)
EST. AVERAGE GLUCOSE BLD GHB EST-MCNC: 120 MG/DL
GFR SERPL CREATININE-BSD FRML MDRD: 102 ML/MIN/1.73SQ M
GLUCOSE P FAST SERPL-MCNC: 91 MG/DL (ref 65–99)
GLUCOSE UR STRIP-MCNC: NEGATIVE MG/DL
HBA1C MFR BLD: 5.8 %
HCT VFR BLD AUTO: 41.1 % (ref 34.8–46.1)
HDLC SERPL-MCNC: 56 MG/DL
HGB BLD-MCNC: 13.1 G/DL (ref 11.5–15.4)
HGB UR QL STRIP.AUTO: ABNORMAL
IMM GRANULOCYTES # BLD AUTO: 0.01 THOUSAND/UL (ref 0–0.2)
IMM GRANULOCYTES NFR BLD AUTO: 0 % (ref 0–2)
KETONES UR STRIP-MCNC: ABNORMAL MG/DL
LDLC SERPL CALC-MCNC: 68 MG/DL (ref 0–100)
LEUKOCYTE ESTERASE UR QL STRIP: NEGATIVE
LYMPHOCYTES # BLD AUTO: 2.39 THOUSANDS/ÂΜL (ref 0.6–4.47)
LYMPHOCYTES NFR BLD AUTO: 36 % (ref 14–44)
MCH RBC QN AUTO: 28 PG (ref 26.8–34.3)
MCHC RBC AUTO-ENTMCNC: 31.9 G/DL (ref 31.4–37.4)
MCV RBC AUTO: 88 FL (ref 82–98)
MONOCYTES # BLD AUTO: 0.54 THOUSAND/ÂΜL (ref 0.17–1.22)
MONOCYTES NFR BLD AUTO: 8 % (ref 4–12)
MUCOUS THREADS UR QL AUTO: ABNORMAL
NEUTROPHILS # BLD AUTO: 3.69 THOUSANDS/ÂΜL (ref 1.85–7.62)
NEUTS SEG NFR BLD AUTO: 55 % (ref 43–75)
NITRITE UR QL STRIP: NEGATIVE
NON-SQ EPI CELLS URNS QL MICRO: ABNORMAL /HPF
NONHDLC SERPL-MCNC: 82 MG/DL
NRBC BLD AUTO-RTO: 0 /100 WBCS
PH UR STRIP.AUTO: 6 [PH]
PLATELET # BLD AUTO: 240 THOUSANDS/UL (ref 149–390)
PMV BLD AUTO: 9.9 FL (ref 8.9–12.7)
POTASSIUM SERPL-SCNC: 4.1 MMOL/L (ref 3.5–5.3)
PROT SERPL-MCNC: 7.2 G/DL (ref 6.4–8.4)
PROT UR STRIP-MCNC: ABNORMAL MG/DL
RBC # BLD AUTO: 4.68 MILLION/UL (ref 3.81–5.12)
RBC #/AREA URNS AUTO: ABNORMAL /HPF
SODIUM SERPL-SCNC: 137 MMOL/L (ref 135–147)
SP GR UR STRIP.AUTO: 1.02 (ref 1–1.03)
TRIGL SERPL-MCNC: 69 MG/DL
UROBILINOGEN UR STRIP-ACNC: <2 MG/DL
WBC # BLD AUTO: 6.74 THOUSAND/UL (ref 4.31–10.16)
WBC #/AREA URNS AUTO: ABNORMAL /HPF

## 2024-11-11 PROCEDURE — 80053 COMPREHEN METABOLIC PANEL: CPT

## 2024-11-11 PROCEDURE — 81001 URINALYSIS AUTO W/SCOPE: CPT

## 2024-11-11 PROCEDURE — 85025 COMPLETE CBC W/AUTO DIFF WBC: CPT

## 2024-11-11 PROCEDURE — 80061 LIPID PANEL: CPT

## 2024-11-11 PROCEDURE — 36415 COLL VENOUS BLD VENIPUNCTURE: CPT

## 2024-11-11 PROCEDURE — 83036 HEMOGLOBIN GLYCOSYLATED A1C: CPT

## 2024-11-13 ENCOUNTER — OFFICE VISIT (OUTPATIENT)
Age: 59
End: 2024-11-13
Payer: COMMERCIAL

## 2024-11-13 VITALS
RESPIRATION RATE: 16 BRPM | DIASTOLIC BLOOD PRESSURE: 86 MMHG | SYSTOLIC BLOOD PRESSURE: 128 MMHG | HEIGHT: 61 IN | BODY MASS INDEX: 24.51 KG/M2 | HEART RATE: 72 BPM | OXYGEN SATURATION: 98 % | WEIGHT: 129.8 LBS | TEMPERATURE: 97.5 F

## 2024-11-13 DIAGNOSIS — E78.01 FAMILIAL HYPERCHOLESTEROLEMIA: ICD-10-CM

## 2024-11-13 DIAGNOSIS — R73.9 HYPERGLYCEMIA: ICD-10-CM

## 2024-11-13 DIAGNOSIS — M70.61 GREATER TROCHANTERIC BURSITIS OF RIGHT HIP: ICD-10-CM

## 2024-11-13 DIAGNOSIS — Z00.00 HEALTHCARE MAINTENANCE: ICD-10-CM

## 2024-11-13 DIAGNOSIS — I10 PRIMARY HYPERTENSION: Primary | ICD-10-CM

## 2024-11-13 PROCEDURE — 99396 PREV VISIT EST AGE 40-64: CPT | Performed by: INTERNAL MEDICINE

## 2024-11-13 NOTE — PROGRESS NOTES
Ambulatory Visit  Name: Michelle Cohn      : 1965      MRN: [id number]  Encounter Provider: Vickey Christie DO  Encounter Date: 2024   Encounter department: [facility]    Assessment & Plan  Primary hypertension  History of hypertension.  Remains on medication as previously and as noted blood pressure showing excellent control renal function is stable.  Will continue with present medication and surveillance.  Continue our surveillance and make changes medication in the future if indicated.    Orders:    Basic metabolic panel; Future    Hyperglycemia  History of hyperglycemia but without overt diabetes.  Her fasting blood sugar is showing stability along with her hemoglobin A1c.  Patient states she is working extremely hard on her diet and staying physically active.    Orders:    Hemoglobin A1C; Future    Healthcare maintenance  Patient is a 59-year-old female with history of medical problems outlined previously who is here today for repeat yearly physical.  Patient states she is doing well.  Does have some arthritic aches and pains with restriction to her mobility because of this.  Patient also has been taking generic medication in order to help with weight loss and has been successful without any significant side effects.  She did have extensive lab testing performed prior to visit today and we did discuss the results showing no significant abnormalities that we need to address at this point in time.  Patient will continue with present medication and surveillance and will return to the office approximately 6 months check another lipid profile.  Continue with present medication         Familial hypercholesterolemia  Family history of hyperlipidemia.  She remains on Crestor 5 mg daily and cholesterol showing excellent control.  Check this again with her next visit and make adjustment of medication as needed.         Greater trochanteric bursitis of right hip  Did have  therapeutic injection of steroids greater trochanter on the right.  Still is having recurrence of pain.  Will continue to follow-up with orthopedics              History of Present Illness     Patient is a 59-year-old female history of multiple medical problems outlined previously who is here today for a yearly physical.  She did have extensive lab testing performed prior to the visit today and we did discuss the results at length with the patient.  Patient states she is doing relatively well but still is having some arthritic discomfort and continues to follow-up with orthopedics.  Continues to work on diet and weight loss      Review of Systems   Constitutional: Negative.    HENT: Negative.     Eyes: Negative.    Respiratory: Negative.     Cardiovascular: Negative.    Gastrointestinal: Negative.    Endocrine: Negative.    Genitourinary: Negative.    Musculoskeletal:  Positive for arthralgias. Negative for back pain, gait problem, joint swelling, myalgias, neck pain and neck stiffness.   Skin: Negative.    Allergic/Immunologic: Negative.    Neurological: Negative.    Hematological: Negative.    Psychiatric/Behavioral: Negative.       Past Medical History:   Diagnosis Date    Abdominal bloating     Abnormal Pap smear of cervix     Borderline hypertension     Breast lipoma     Dermatography     GERD (gastroesophageal reflux disease)     Gestational diabetes     Hip pain     HPV (human papilloma virus) anogenital infection     Hypertension     Lumbar disc disease     Undiagnosed cardiac murmurs      Past Surgical History:   Procedure Laterality Date    ABDOMINOPLASTY      BREAST BIOPSY Right     BREAST LUMPECTOMY Right      SECTION      HIP ARTHROPLASTY Left     LIPOSUCTION Bilateral     TOTAL HIP ARTHROPLASTY Left     WISDOM TOOTH EXTRACTION       Family History   Problem Relation Age of Onset    Hypertension Mother     Stroke Father     Hypertension Father     No Known Problems Sister     No Known Problems  "Daughter     Ovarian cancer Paternal Aunt 45    Heart disease Family      Social History     Tobacco Use    Smoking status: Never    Smokeless tobacco: Never   Vaping Use    Vaping status: Never Used   Substance and Sexual Activity    Alcohol use: Yes     Alcohol/week: 4.0 standard drinks of alcohol     Types: 4 Glasses of wine per week     Comment: social drinker    Drug use: Never    Sexual activity: Yes     Partners: Male     Birth control/protection: Condom Male     Current Outpatient Medications on File Prior to Visit   Medication Sig    lisinopril (ZESTRIL) 10 mg tablet Take 1 tablet (10 mg total) by mouth daily    minoxidil (LONITEN) 2.5 mg tablet Take 1.5 mg by mouth daily    rosuvastatin (CRESTOR) 5 mg tablet TAKE 1 TABLET BY MOUTH EVERY DAY    zolpidem (AMBIEN) 10 mg tablet Take 1 tablet (10 mg total) by mouth daily as needed for sleep     No Known Allergies  Immunization History   Administered Date(s) Administered    COVID-19 PFIZER VACCINE 0.3 ML IM 12/22/2020, 01/08/2021, 09/27/2021, 05/29/2022    COVID-19 Pfizer mRNA vacc PF andi-sucrose 12 yr and older (Comirnaty) 10/21/2024    COVID-19 Pfizer vac (Andi-sucrose, gray cap) 12 yr+ IM 05/24/2022    Fluzone Split Quad 0.25 mL 09/08/2020, 10/11/2022    INFLUENZA 09/27/2021    Tdap 09/25/2023    Tuberculin Skin Test-PPD Intradermal 07/28/2021    Zoster Vaccine Recombinant 09/15/2020, 12/02/2020     Objective     /86   Pulse 72   Temp 97.5 °F (36.4 °C)   Resp 16   Ht 5' 1\" (1.549 m)   Wt 58.9 kg (129 lb 12.8 oz)   LMP  (LMP Unknown)   SpO2 98%   BMI 24.53 kg/m²     Physical Exam  Vitals and nursing note reviewed.   Constitutional:       General: She is not in acute distress.     Appearance: Normal appearance. She is normal weight. She is not ill-appearing, toxic-appearing or diaphoretic.      Comments: Pleasant, articulate 59-year-old female who is awake alert in no acute distress and oriented x 3   HENT:      Head: Normocephalic and " atraumatic.      Right Ear: Tympanic membrane, ear canal and external ear normal. There is no impacted cerumen.      Left Ear: Tympanic membrane, ear canal and external ear normal. There is no impacted cerumen.      Nose: No congestion or rhinorrhea.      Mouth/Throat:      Mouth: Mucous membranes are moist.      Pharynx: Oropharynx is clear. No oropharyngeal exudate or posterior oropharyngeal erythema.   Eyes:      General: No scleral icterus.        Right eye: No discharge.         Left eye: No discharge.      Extraocular Movements: Extraocular movements intact.      Conjunctiva/sclera: Conjunctivae normal.      Pupils: Pupils are equal, round, and reactive to light.   Neck:      Vascular: No carotid bruit.   Cardiovascular:      Rate and Rhythm: Normal rate and regular rhythm.      Pulses: Normal pulses.      Heart sounds: Normal heart sounds. No murmur heard.     No friction rub. No gallop.   Pulmonary:      Effort: Pulmonary effort is normal. No respiratory distress.      Breath sounds: Normal breath sounds. No stridor. No wheezing, rhonchi or rales.   Chest:      Chest wall: No tenderness.   Abdominal:      General: Abdomen is flat. Bowel sounds are normal. There is no distension.      Palpations: Abdomen is soft. There is no mass.      Tenderness: There is no abdominal tenderness. There is no right CVA tenderness, left CVA tenderness, guarding or rebound.      Hernia: No hernia is present.   Genitourinary:     Comments: Reminded patient of the importance of follow-up routine basis with gynecology, colorectal screening  Musculoskeletal:         General: No swelling, tenderness, deformity or signs of injury. Normal range of motion.      Cervical back: Normal range of motion and neck supple. No rigidity or tenderness.      Right lower leg: No edema.      Left lower leg: No edema.   Lymphadenopathy:      Cervical: No cervical adenopathy.   Skin:     General: Skin is warm and dry.      Capillary Refill: Capillary  refill takes less than 2 seconds.      Coloration: Skin is not jaundiced or pale.      Findings: No bruising, erythema, lesion or rash.   Neurological:      General: No focal deficit present.      Mental Status: She is alert and oriented to person, place, and time. Mental status is at baseline.      Cranial Nerves: No cranial nerve deficit.      Sensory: No sensory deficit.      Motor: No weakness.      Coordination: Coordination normal.      Gait: Gait normal.      Deep Tendon Reflexes: Reflexes normal.   Psychiatric:         Mood and Affect: Mood normal.         Behavior: Behavior normal.         Thought Content: Thought content normal.         Judgment: Judgment normal.

## 2024-11-13 NOTE — ASSESSMENT & PLAN NOTE
Family history of hyperlipidemia.  She remains on Crestor 5 mg daily and cholesterol showing excellent control.  Check this again with her next visit and make adjustment of medication as needed.

## 2024-11-13 NOTE — ASSESSMENT & PLAN NOTE
Patient is a 59-year-old female with history of medical problems outlined previously who is here today for repeat yearly physical.  Patient states she is doing well.  Does have some arthritic aches and pains with restriction to her mobility because of this.  Patient also has been taking generic medication in order to help with weight loss and has been successful without any significant side effects.  She did have extensive lab testing performed prior to visit today and we did discuss the results showing no significant abnormalities that we need to address at this point in time.  Patient will continue with present medication and surveillance and will return to the office approximately 6 months check another lipid profile.  Continue with present medication

## 2024-11-13 NOTE — ASSESSMENT & PLAN NOTE
History of hyperglycemia but without overt diabetes.  Her fasting blood sugar is showing stability along with her hemoglobin A1c.  Patient states she is working extremely hard on her diet and staying physically active.    Orders:    Hemoglobin A1C; Future

## 2024-11-13 NOTE — ASSESSMENT & PLAN NOTE
History of hypertension.  Remains on medication as previously and as noted blood pressure showing excellent control renal function is stable.  Will continue with present medication and surveillance.  Continue our surveillance and make changes medication in the future if indicated.    Orders:    Basic metabolic panel; Future

## 2024-11-13 NOTE — ASSESSMENT & PLAN NOTE
Did have therapeutic injection of steroids greater trochanter on the right.  Still is having recurrence of pain.  Will continue to follow-up with orthopedics

## 2024-11-17 RX ORDER — SODIUM CHLORIDE, SODIUM LACTATE, POTASSIUM CHLORIDE, CALCIUM CHLORIDE 600; 310; 30; 20 MG/100ML; MG/100ML; MG/100ML; MG/100ML
125 INJECTION, SOLUTION INTRAVENOUS CONTINUOUS
Status: CANCELLED | OUTPATIENT
Start: 2024-11-17

## 2024-11-18 ENCOUNTER — ANESTHESIA (OUTPATIENT)
Dept: GASTROENTEROLOGY | Facility: AMBULARY SURGERY CENTER | Age: 59
End: 2024-11-18
Payer: COMMERCIAL

## 2024-11-18 ENCOUNTER — HOSPITAL ENCOUNTER (OUTPATIENT)
Dept: GASTROENTEROLOGY | Facility: AMBULARY SURGERY CENTER | Age: 59
Setting detail: OUTPATIENT SURGERY
Discharge: HOME | End: 2024-11-18
Attending: INTERNAL MEDICINE
Payer: COMMERCIAL

## 2024-11-18 VITALS
DIASTOLIC BLOOD PRESSURE: 91 MMHG | SYSTOLIC BLOOD PRESSURE: 141 MMHG | HEART RATE: 68 BPM | BODY MASS INDEX: 24.54 KG/M2 | TEMPERATURE: 98.4 F | HEIGHT: 60 IN | WEIGHT: 125 LBS | RESPIRATION RATE: 18 BRPM | OXYGEN SATURATION: 99 %

## 2024-11-18 DIAGNOSIS — Z12.11 SCREENING FOR COLON CANCER: ICD-10-CM

## 2024-11-18 PROCEDURE — 88305 TISSUE EXAM BY PATHOLOGIST: CPT | Performed by: PATHOLOGY

## 2024-11-18 PROCEDURE — 45380 COLONOSCOPY AND BIOPSY: CPT | Performed by: INTERNAL MEDICINE

## 2024-11-18 RX ORDER — PROPOFOL 10 MG/ML
INJECTION, EMULSION INTRAVENOUS AS NEEDED
Status: DISCONTINUED | OUTPATIENT
Start: 2024-11-18 | End: 2024-11-18

## 2024-11-18 RX ORDER — LIDOCAINE HYDROCHLORIDE 10 MG/ML
INJECTION, SOLUTION EPIDURAL; INFILTRATION; INTRACAUDAL; PERINEURAL AS NEEDED
Status: DISCONTINUED | OUTPATIENT
Start: 2024-11-18 | End: 2024-11-18

## 2024-11-18 RX ORDER — SODIUM CHLORIDE, SODIUM LACTATE, POTASSIUM CHLORIDE, CALCIUM CHLORIDE 600; 310; 30; 20 MG/100ML; MG/100ML; MG/100ML; MG/100ML
125 INJECTION, SOLUTION INTRAVENOUS CONTINUOUS
Status: DISCONTINUED | OUTPATIENT
Start: 2024-11-18 | End: 2024-11-22 | Stop reason: HOSPADM

## 2024-11-18 RX ADMIN — PROPOFOL 50 MG: 10 INJECTION, EMULSION INTRAVENOUS at 13:57

## 2024-11-18 RX ADMIN — PROPOFOL 100 MG: 10 INJECTION, EMULSION INTRAVENOUS at 13:53

## 2024-11-18 RX ADMIN — SODIUM CHLORIDE, SODIUM LACTATE, POTASSIUM CHLORIDE, AND CALCIUM CHLORIDE: .6; .31; .03; .02 INJECTION, SOLUTION INTRAVENOUS at 13:00

## 2024-11-18 RX ADMIN — PROPOFOL 50 MG: 10 INJECTION, EMULSION INTRAVENOUS at 14:06

## 2024-11-18 RX ADMIN — PROPOFOL 50 MG: 10 INJECTION, EMULSION INTRAVENOUS at 14:01

## 2024-11-18 RX ADMIN — LIDOCAINE HYDROCHLORIDE 50 MG: 10 INJECTION, SOLUTION EPIDURAL; INFILTRATION; INTRACAUDAL at 13:53

## 2024-11-18 NOTE — ANESTHESIA POSTPROCEDURE EVALUATION
Post-Op Assessment Note    CV Status:  Stable  Pain Score: 0    Pain management: adequate       Mental Status:  Sleepy   Hydration Status:  Stable   PONV Controlled:  None   Airway Patency:  Patent     Post Op Vitals Reviewed: Yes    No anethesia notable event occurred.    Staff: CRNA           Last Filed PACU Vitals:  Vitals Value Taken Time   Temp 98.6    Pulse 80    BP 98\9/62    Resp 18    SpO2 97% on RA      Post procedure VS noted above, SV non obstructed     Modified Georges:  Activity: 2 (11/18/2024 12:55 PM)  Respiration: 2 (11/18/2024 12:55 PM)  Circulation: 2 (11/18/2024 12:55 PM)  Consciousness: 2 (11/18/2024 12:55 PM)  Oxygen Saturation: 2 (11/18/2024 12:55 PM)  Modified Georges Score: 10 (11/18/2024 12:55 PM)

## 2024-11-18 NOTE — H&P
History and Physical - [facility]  Michelle Cohn 59 y.o. female MRN: [id number]                  HPI: Michelle Cohn is a 59 y.o. year old female who presents for colon cancer screening.      REVIEW OF SYSTEMS: Per the HPI, and otherwise unremarkable.    Historical Information   Past Medical History:   Diagnosis Date    Abdominal bloating     Abnormal Pap smear of cervix     Borderline hypertension     Breast lipoma     Dermatography     GERD (gastroesophageal reflux disease)     Gestational diabetes     Hip pain     HPV (human papilloma virus) anogenital infection     Hyperlipidemia     Hypertension     Lumbar disc disease     Undiagnosed cardiac murmurs      Past Surgical History:   Procedure Laterality Date    ABDOMINOPLASTY      BREAST BIOPSY Right     BREAST LUMPECTOMY Right      SECTION      HIP ARTHROPLASTY Left     LIPOSUCTION Bilateral     TOTAL HIP ARTHROPLASTY Left     WISDOM TOOTH EXTRACTION       Social History   Social History     Substance and Sexual Activity   Alcohol Use Yes    Alcohol/week: 4.0 standard drinks of alcohol    Types: 4 Glasses of wine per week    Comment: social drinker     Social History     Substance and Sexual Activity   Drug Use Never     Social History     Tobacco Use   Smoking Status Never   Smokeless Tobacco Never     Family History   Problem Relation Age of Onset    Hypertension Mother     Stroke Father     Hypertension Father     No Known Problems Sister     No Known Problems Daughter     Ovarian cancer Paternal Aunt 45    Heart disease Family        Meds/Allergies       Current Outpatient Medications:     lisinopril (ZESTRIL) 10 mg tablet    minoxidil (LONITEN) 2.5 mg tablet    rosuvastatin (CRESTOR) 5 mg tablet    zolpidem (AMBIEN) 10 mg tablet    Current Facility-Administered Medications:     lactated ringers infusion, 125 mL/hr, Intravenous, Continuous    No Known Allergies    Objective     /91   Pulse 76   Temp (!) 97.1 °F (36.2 °C)  (Temporal)   Resp 18   Ht 5' (1.524 m)   Wt 56.7 kg (125 lb)   LMP  (LMP Unknown)   SpO2 100%   BMI 24.41 kg/m²       PHYSICAL EXAM    Gen: NAD  Head: NCAT  CV: RRR  CHEST: Clear  ABD: soft, NT/ND  EXT: no edema      ASSESSMENT/PLAN:  This is a 59 y.o. year old female here for colonoscopy, and she is stable and optimized for her procedure.

## 2024-11-18 NOTE — ANESTHESIA PREPROCEDURE EVALUATION
Procedure:  COLONOSCOPY    Relevant Problems   CARDIO   (+) Familial hypercholesterolemia   (+) Primary hypertension        Physical Exam    Airway    Mallampati score: I  TM Distance: >3 FB  Neck ROM: full     Dental   No notable dental hx     Cardiovascular      Pulmonary      Other Findings  post-pubertal.      Anesthesia Plan  ASA Score- 2     Anesthesia Type- IV sedation with anesthesia with ASA Monitors.         Additional Monitors:     Airway Plan:            Plan Factors-Exercise tolerance (METS): >4 METS.    Chart reviewed.    Patient summary reviewed.    Patient is not a current smoker.      Obstructive sleep apnea risk education given perioperatively.        Induction- intravenous.    Postoperative Plan-     Perioperative Resuscitation Plan - Level 1 - Full Code.       Informed Consent- Anesthetic plan and risks discussed with patient.  I personally reviewed this patient with the CRNA. Discussed and agreed on the Anesthesia Plan with the CRNA..

## 2024-11-25 ENCOUNTER — RESULTS FOLLOW-UP (OUTPATIENT)
Dept: GASTROENTEROLOGY | Facility: CLINIC | Age: 59
End: 2024-11-25

## 2024-11-25 PROCEDURE — 88305 TISSUE EXAM BY PATHOLOGIST: CPT | Performed by: PATHOLOGY

## 2024-12-13 PROBLEM — Z00.00 HEALTHCARE MAINTENANCE: Status: RESOLVED | Noted: 2024-08-12 | Resolved: 2024-12-13

## 2024-12-26 ENCOUNTER — TELEPHONE (OUTPATIENT)
Age: 59
End: 2024-12-26

## 2024-12-26 ENCOUNTER — ANNUAL EXAM (OUTPATIENT)
Dept: OBGYN CLINIC | Facility: CLINIC | Age: 59
End: 2024-12-26
Payer: COMMERCIAL

## 2024-12-26 VITALS
HEIGHT: 60 IN | WEIGHT: 122.8 LBS | SYSTOLIC BLOOD PRESSURE: 102 MMHG | BODY MASS INDEX: 24.11 KG/M2 | DIASTOLIC BLOOD PRESSURE: 68 MMHG

## 2024-12-26 DIAGNOSIS — Z12.31 ENCOUNTER FOR SCREENING MAMMOGRAM FOR MALIGNANT NEOPLASM OF BREAST: ICD-10-CM

## 2024-12-26 DIAGNOSIS — Z01.419 ENCOUNTER FOR ANNUAL ROUTINE GYNECOLOGICAL EXAMINATION: Primary | ICD-10-CM

## 2024-12-26 PROCEDURE — S0612 ANNUAL GYNECOLOGICAL EXAMINA: HCPCS | Performed by: OBSTETRICS & GYNECOLOGY

## 2024-12-26 NOTE — PROGRESS NOTES
Name: Michelle Cohn      : 1965      MRN: [id number]  Encounter Provider: Janette Breen DO  Encounter Date: 2024   Encounter department: [facility]  :  Assessment & Plan  Encounter for annual routine gynecological examination         Encounter for screening mammogram for malignant neoplasm of breast         Pap done as well as annual.  Cultures obtained for GC, chlamydia off of ThinPrep.  Encouraged self breast examination as well as calcium supplementation.  Continue annual mammogram, scheduled .  She will continue to follow-up with primary care as scheduled.  Return to office in 1 year or as needed    History of Present Illness   HPI  Michelle Cohn is a 59 y.o. female who presents     This is a pleasant 59-year-old female P1 ( x 1, age 23) presents for GYN exam.  She went through menopause at age 49.  She has never been on hormone replacement therapy.  She denies any vaginal bleeding or spotting.  No changes in bowel or bladder function.  She has been in a monogamous relationship for 2.5 years.  She does have some mild dryness, uses a lubricant with success.  She does recall having an abnormal Pap smear in  with colposcopy revealing WILBER-1 HPV.  Pap smear last year was normal.  She is requesting repeat HPV as well as STD screening.    2024 colonoscopy, polyp, follow-up 7 years    Mammogram 2024, scheduled for screening mammogram    Pap    History of left hip replacement.  She was experiencing right hip pain and was scheduled for right hip replacement 2025 at [facility].  There is been a 10 pound weight loss.  She states her hip pain is much improved.  She is considering canceling her surgery.    History obtained from: patient    Review of Systems   Constitutional:  Negative for fatigue, fever and unexpected weight change.   Respiratory:  Negative for cough, chest tightness, shortness of breath and wheezing.    Cardiovascular: Negative.  Negative for chest  pain and palpitations.   Gastrointestinal: Negative.  Negative for abdominal distention, abdominal pain, blood in stool, constipation, diarrhea, nausea and vomiting.   Genitourinary: Negative.  Negative for difficulty urinating, dyspareunia, dysuria, flank pain, frequency, genital sores, hematuria, pelvic pain, urgency, vaginal bleeding, vaginal discharge and vaginal pain.   Skin:  Negative for rash.     Current Outpatient Medications on File Prior to Visit   Medication Sig Dispense Refill    lisinopril (ZESTRIL) 10 mg tablet Take 1 tablet (10 mg total) by mouth daily 90 tablet 3    minoxidil (LONITEN) 2.5 mg tablet Take 1.5 mg by mouth daily      rosuvastatin (CRESTOR) 5 mg tablet TAKE 1 TABLET BY MOUTH EVERY DAY 90 tablet 1    SEMAGLUTIDE PO Take by mouth Every week(injection)      zolpidem (AMBIEN) 10 mg tablet Take 1 tablet (10 mg total) by mouth daily as needed for sleep 30 tablet 3     No current facility-administered medications on file prior to visit.         Objective   /68   Ht 5' (1.524 m)   Wt 55.7 kg (122 lb 12.8 oz)   LMP  (LMP Unknown)   BMI 23.98 kg/m²      Physical Exam  Constitutional:       Appearance: Normal appearance. She is well-developed.   HENT:      Head: Normocephalic and atraumatic.   Cardiovascular:      Rate and Rhythm: Normal rate and regular rhythm.   Pulmonary:      Effort: Pulmonary effort is normal.      Breath sounds: Normal breath sounds.   Chest:   Breasts:     Right: No inverted nipple, mass, nipple discharge, skin change or tenderness.      Left: No inverted nipple, mass, nipple discharge, skin change or tenderness.   Abdominal:      General: Bowel sounds are normal. There is no distension.      Palpations: Abdomen is soft.      Tenderness: There is no abdominal tenderness. There is no guarding or rebound.   Genitourinary:     Labia:         Right: No rash, tenderness or lesion.         Left: No rash, tenderness or lesion.       Vagina: Normal. No signs of injury. No  vaginal discharge or tenderness.      Cervix: No cervical motion tenderness, discharge, friability, lesion or cervical bleeding.      Uterus: Not enlarged and not tender.       Adnexa:         Right: No mass, tenderness or fullness.          Left: No mass, tenderness or fullness.     Neurological:      Mental Status: She is alert.   Psychiatric:         Behavior: Behavior normal.       External genitalia is within normal limits.  The vagina is evident of slight estrogen deficiency.  Cervix is small the os is stenotic.  No pelvic floor prolapse.    Administrative Statements   I have spent a total time of 25 minutes in caring for this patient on the day of the visit/encounter including Instructions for management, Impressions, Counseling / Coordination of care, Documenting in the medical record, Reviewing / ordering tests, medicine, procedures  , and Obtaining or reviewing history  .

## 2024-12-26 NOTE — TELEPHONE ENCOUNTER
Patient was seen today by Dr. Breen. Her friend who accompanied who believes they may have left their reading glasses in the waiting area. Spoke with Jose Enrique who confirmed unable to find reading glasses and nothing was turned in. Patient made aware of the same.

## 2024-12-30 ENCOUNTER — RESULTS FOLLOW-UP (OUTPATIENT)
Dept: OBGYN CLINIC | Facility: CLINIC | Age: 59
End: 2024-12-30

## 2024-12-30 LAB
C TRACH RRNA CVX QL NAA+PROBE: NEGATIVE
CYTOLOGIST CVX/VAG CYTO: NORMAL
DX ICD CODE: NORMAL
HPV GENOTYPE REFLEX: NORMAL
HPV I/H RISK 4 DNA CVX QL PROBE+SIG AMP: NEGATIVE
N GONORRHOEA RRNA CVX QL NAA+PROBE: NEGATIVE
OTHER STN SPEC: NORMAL
PATH REPORT.FINAL DX SPEC: NORMAL
SL AMB NOTE:: NORMAL
SL AMB SPECIMEN ADEQUACY: NORMAL
SL AMB TEST METHODOLOGY: NORMAL

## 2025-01-28 ENCOUNTER — HOSPITAL ENCOUNTER (OUTPATIENT)
Dept: RADIOLOGY | Age: 60
Discharge: HOME | End: 2025-01-28
Payer: COMMERCIAL

## 2025-01-28 DIAGNOSIS — Z12.31 ENCOUNTER FOR SCREENING MAMMOGRAM FOR MALIGNANT NEOPLASM OF BREAST: ICD-10-CM

## 2025-01-28 PROCEDURE — 77067 SCR MAMMO BI INCL CAD: CPT

## 2025-01-28 PROCEDURE — 77063 BREAST TOMOSYNTHESIS BI: CPT

## 2025-05-06 DIAGNOSIS — E78.01 FAMILIAL HYPERCHOLESTEROLEMIA: ICD-10-CM

## 2025-05-07 RX ORDER — ROSUVASTATIN CALCIUM 5 MG/1
5 TABLET, COATED ORAL DAILY
Qty: 90 TABLET | Refills: 1 | Status: SHIPPED | OUTPATIENT
Start: 2025-05-07

## 2025-05-13 ENCOUNTER — OFFICE VISIT (OUTPATIENT)
Age: 60
End: 2025-05-13
Payer: COMMERCIAL

## 2025-05-13 ENCOUNTER — APPOINTMENT (OUTPATIENT)
Dept: LAB | Facility: CLINIC | Age: 60
End: 2025-05-13
Attending: INTERNAL MEDICINE
Payer: COMMERCIAL

## 2025-05-13 VITALS
SYSTOLIC BLOOD PRESSURE: 106 MMHG | TEMPERATURE: 97.9 F | OXYGEN SATURATION: 98 % | HEIGHT: 60 IN | BODY MASS INDEX: 22.38 KG/M2 | RESPIRATION RATE: 16 BRPM | WEIGHT: 114 LBS | DIASTOLIC BLOOD PRESSURE: 60 MMHG | HEART RATE: 74 BPM

## 2025-05-13 DIAGNOSIS — R73.9 HYPERGLYCEMIA: ICD-10-CM

## 2025-05-13 DIAGNOSIS — I10 PRIMARY HYPERTENSION: ICD-10-CM

## 2025-05-13 DIAGNOSIS — M70.61 GREATER TROCHANTERIC BURSITIS OF RIGHT HIP: ICD-10-CM

## 2025-05-13 DIAGNOSIS — E78.01 FAMILIAL HYPERCHOLESTEROLEMIA: ICD-10-CM

## 2025-05-13 DIAGNOSIS — F51.01 PRIMARY INSOMNIA: ICD-10-CM

## 2025-05-13 DIAGNOSIS — E78.01 FAMILIAL HYPERCHOLESTEROLEMIA: Primary | ICD-10-CM

## 2025-05-13 LAB
ANION GAP SERPL CALCULATED.3IONS-SCNC: 10 MMOL/L (ref 4–13)
BUN SERPL-MCNC: 14 MG/DL (ref 5–25)
CALCIUM SERPL-MCNC: 9.6 MG/DL (ref 8.4–10.2)
CHLORIDE SERPL-SCNC: 102 MMOL/L (ref 96–108)
CHOLEST SERPL-MCNC: 168 MG/DL (ref ?–200)
CO2 SERPL-SCNC: 26 MMOL/L (ref 21–32)
CREAT SERPL-MCNC: 0.63 MG/DL (ref 0.6–1.3)
EST. AVERAGE GLUCOSE BLD GHB EST-MCNC: 105 MG/DL
GFR SERPL CREATININE-BSD FRML MDRD: 97 ML/MIN/1.73SQ M
GLUCOSE P FAST SERPL-MCNC: 91 MG/DL (ref 65–99)
HBA1C MFR BLD: 5.3 %
HDLC SERPL-MCNC: 68 MG/DL
LDLC SERPL CALC-MCNC: 87 MG/DL (ref 0–100)
NONHDLC SERPL-MCNC: 100 MG/DL
POTASSIUM SERPL-SCNC: 4.1 MMOL/L (ref 3.5–5.3)
SODIUM SERPL-SCNC: 138 MMOL/L (ref 135–147)
TRIGL SERPL-MCNC: 64 MG/DL (ref ?–150)

## 2025-05-13 PROCEDURE — 36415 COLL VENOUS BLD VENIPUNCTURE: CPT

## 2025-05-13 PROCEDURE — 80048 BASIC METABOLIC PNL TOTAL CA: CPT

## 2025-05-13 PROCEDURE — 99214 OFFICE O/P EST MOD 30 MIN: CPT | Performed by: INTERNAL MEDICINE

## 2025-05-13 PROCEDURE — 80061 LIPID PANEL: CPT

## 2025-05-13 PROCEDURE — 83036 HEMOGLOBIN GLYCOSYLATED A1C: CPT

## 2025-05-13 RX ORDER — ZOLPIDEM TARTRATE 10 MG/1
10 TABLET ORAL DAILY PRN
Qty: 30 TABLET | Refills: 3 | Status: SHIPPED | OUTPATIENT
Start: 2025-05-13

## 2025-05-13 NOTE — ASSESSMENT & PLAN NOTE
Patient does have a history of elevated blood pressure readings and she remains on lisinopril 10 mg daily.  Her blood pressure is down to 106/60 and she has no complaints of lightheadedness or any other associations with hypotension.  She was told if she does have any symptoms might be secondary to lower blood pressure readings we would have the availability to decreased her lisinopril from 10 to 5 mg daily

## 2025-05-13 NOTE — ASSESSMENT & PLAN NOTE
Family history of hyperlipidemia.  She remains on Crestor 5 mg daily.  Again curious as to whether or not the cholesterol has changed with her being on Ozempic and was given a slip to have this checked and we will call if we need to make any adjustment medication.  States that that she still enjoys a good meal and is not always perfect with her diet but she just eats a lot less    Orders:    Lipid panel; Future

## 2025-05-13 NOTE — ASSESSMENT & PLAN NOTE
Patient states she has had almost complete resolution of her hip pain with her weight loss.  Patient is urged to call if any new changes and we can arrange for her to be seen by orthopedics.

## 2025-05-13 NOTE — PROGRESS NOTES
Name: Michelle Cohn      : 1965      MRN: [id number]  Encounter Provider: Vickey Christie DO  Encounter Date: 2025   Encounter department: [facility] INTERNAL MEDICINE    Assessment & Plan  Primary insomnia    Orders:  •  zolpidem (AMBIEN) 10 mg tablet; Take 1 tablet (10 mg total) by mouth daily as needed for sleep    Hyperglycemia  Patient has a history of hyperglycemia but not overt diabetes.  Since her last visit 6 months ago patient has been taking Ozempic and has lost considerable amount of weight.  She is curious about whether this has caused any changes with a fasting blood sugar which we will check along with a hemoglobin A1c.  Patient will be called if there is any abnormalities and whether further workup evaluation and/or treatment is necessary.         Primary hypertension  Patient does have a history of elevated blood pressure readings and she remains on lisinopril 10 mg daily.  Her blood pressure is down to 106/60 and she has no complaints of lightheadedness or any other associations with hypotension.  She was told if she does have any symptoms might be secondary to lower blood pressure readings we would have the availability to decreased her lisinopril from 10 to 5 mg daily         Familial hypercholesterolemia  Family history of hyperlipidemia.  She remains on Crestor 5 mg daily.  Again curious as to whether or not the cholesterol has changed with her being on Ozempic and was given a slip to have this checked and we will call if we need to make any adjustment medication.  States that that she still enjoys a good meal and is not always perfect with her diet but she just eats a lot less    Orders:  •  Lipid panel; Future    Greater trochanteric bursitis of right hip  Patient states she has had almost complete resolution of her hip pain with her weight loss.  Patient is urged to call if any new changes and we can arrange for her to be seen by orthopedics.              History of  Present Illness     Patient is a 60-year-old female history of medical problems outlined previously who is here today for follow-up after 6-month period time.  Patient states that since she was last here she has been on Ozempic because of problems with being overweight.  She states that she has successfully lost a considerable amount of weight and she states along with this she has had less pain in her hip and feeling better both physically and mentally.  Definitely states the medication is decreased her appetite and she is reduced the dose of medication which she gets online but knows that that in the future it may not be obtainable.  Denies any new medical complaints or concerns.    Review of Systems   Constitutional: Negative.         Patient has been able to become more physically active without pain.   HENT: Negative.     Eyes: Negative.    Respiratory: Negative.     Cardiovascular: Negative.    Gastrointestinal: Negative.    Endocrine: Negative.    Genitourinary: Negative.    Musculoskeletal: Negative.    Skin: Negative.    Allergic/Immunologic: Negative.    Neurological: Negative.    Hematological: Negative.    Psychiatric/Behavioral: Negative.       Past Medical History:   Diagnosis Date   • Abdominal bloating    • Abnormal Pap smear of cervix    • Borderline hypertension    • Breast lipoma    • Dermatography    • GERD (gastroesophageal reflux disease)    • Gestational diabetes    • Hip pain    • HPV (human papilloma virus) anogenital infection    • Hyperlipidemia    • Hypertension    • Lumbar disc disease    • Undiagnosed cardiac murmurs      Past Surgical History:   Procedure Laterality Date   • ABDOMINOPLASTY     • BREAST BIOPSY Right    • BREAST LUMPECTOMY Right    •  SECTION     • HIP ARTHROPLASTY Left    • LIPOSUCTION Bilateral    • TOTAL HIP ARTHROPLASTY Left    • WISDOM TOOTH EXTRACTION       Family History   Problem Relation Age of Onset   • Hypertension Mother    • Stroke Father    •  Hypertension Father    • No Known Problems Sister    • No Known Problems Daughter    • Ovarian cancer Paternal Aunt 45   • Heart disease Family      Social History     Tobacco Use   • Smoking status: Never   • Smokeless tobacco: Never   Vaping Use   • Vaping status: Never Used   Substance and Sexual Activity   • Alcohol use: Yes     Alcohol/week: 4.0 standard drinks of alcohol     Types: 4 Glasses of wine per week     Comment: social drinker   • Drug use: Never   • Sexual activity: Yes     Partners: Male     Birth control/protection: Condom Male     Current Outpatient Medications on File Prior to Visit   Medication Sig   • lisinopril (ZESTRIL) 10 mg tablet Take 1 tablet (10 mg total) by mouth daily   • minoxidil (LONITEN) 2.5 mg tablet Take 1.5 mg by mouth daily   • rosuvastatin (CRESTOR) 5 mg tablet TAKE 1 TABLET BY MOUTH EVERY DAY   • SEMAGLUTIDE PO Take by mouth Every week(injection)   • [DISCONTINUED] zolpidem (AMBIEN) 10 mg tablet Take 1 tablet (10 mg total) by mouth daily as needed for sleep     No Known Allergies  Immunization History   Administered Date(s) Administered   • COVID-19 PFIZER VACCINE 0.3 ML IM 12/22/2020, 01/08/2021, 09/27/2021, 05/29/2022   • COVID-19 Pfizer mRNA vacc PF andi-sucrose 12 yr and older (Comirnaty) 10/21/2024   • COVID-19 Pfizer vac (Andi-sucrose, gray cap) 12 yr+ IM 05/24/2022   • Fluzone Split Quad 0.25 mL 09/08/2020, 10/11/2022   • INFLUENZA 09/27/2021   • Tdap 09/25/2023   • Tuberculin Skin Test-PPD Intradermal 07/28/2021   • Zoster Vaccine Recombinant 09/15/2020, 12/02/2020     Objective   /60   Pulse 74   Temp 97.9 °F (36.6 °C)   Resp 16   Ht 5' (1.524 m)   Wt 51.7 kg (114 lb)   LMP  (LMP Unknown)   SpO2 98%   BMI 22.26 kg/m²     Physical Exam  Vitals and nursing note reviewed.   Constitutional:       General: She is not in acute distress.     Appearance: Normal appearance. She is normal weight. She is not ill-appearing, toxic-appearing or diaphoretic.       Comments: Pleasant, articulate, healthy appearing 60-year-old female who is awake alert in no acute distress oriented x 3   HENT:      Head: Normocephalic and atraumatic.      Right Ear: Tympanic membrane, ear canal and external ear normal. There is no impacted cerumen.      Left Ear: Tympanic membrane, ear canal and external ear normal. There is no impacted cerumen.      Nose: No congestion or rhinorrhea.      Mouth/Throat:      Mouth: Mucous membranes are moist.      Pharynx: Oropharynx is clear. No oropharyngeal exudate or posterior oropharyngeal erythema.   Eyes:      General: No scleral icterus.        Right eye: No discharge.         Left eye: No discharge.      Extraocular Movements: Extraocular movements intact.      Conjunctiva/sclera: Conjunctivae normal.      Pupils: Pupils are equal, round, and reactive to light.   Neck:      Vascular: No carotid bruit.   Cardiovascular:      Rate and Rhythm: Normal rate and regular rhythm.      Pulses: Normal pulses.      Heart sounds: Normal heart sounds. No murmur heard.     No friction rub. No gallop.   Pulmonary:      Effort: Pulmonary effort is normal. No respiratory distress.      Breath sounds: Normal breath sounds. No stridor. No wheezing, rhonchi or rales.   Chest:      Chest wall: No tenderness.   Abdominal:      General: Abdomen is flat. Bowel sounds are normal. There is no distension.      Palpations: Abdomen is soft. There is no mass.      Tenderness: There is no abdominal tenderness. There is no right CVA tenderness, left CVA tenderness, guarding or rebound.      Hernia: No hernia is present.   Genitourinary:     Comments: Reminded patient of the importance of follow-up routine basis with gynecology, colorectal screening  Musculoskeletal:         General: No swelling, tenderness, deformity or signs of injury. Normal range of motion.      Cervical back: Normal range of motion and neck supple. No rigidity or tenderness.      Right lower leg: No edema.       Left lower leg: No edema.   Lymphadenopathy:      Cervical: No cervical adenopathy.   Skin:     General: Skin is warm and dry.      Capillary Refill: Capillary refill takes less than 2 seconds.      Coloration: Skin is not jaundiced or pale.      Findings: No bruising, erythema, lesion or rash.   Neurological:      General: No focal deficit present.      Mental Status: She is alert and oriented to person, place, and time. Mental status is at baseline.      Cranial Nerves: No cranial nerve deficit.      Sensory: No sensory deficit.      Motor: No weakness.      Coordination: Coordination normal.      Gait: Gait normal.      Deep Tendon Reflexes: Reflexes normal.   Psychiatric:         Mood and Affect: Mood normal.         Behavior: Behavior normal.         Thought Content: Thought content normal.         Judgment: Judgment normal.

## 2025-05-13 NOTE — ASSESSMENT & PLAN NOTE
Patient has a history of hyperglycemia but not overt diabetes.  Since her last visit 6 months ago patient has been taking Ozempic and has lost considerable amount of weight.  She is curious about whether this has caused any changes with a fasting blood sugar which we will check along with a hemoglobin A1c.  Patient will be called if there is any abnormalities and whether further workup evaluation and/or treatment is necessary.

## 2025-08-17 DIAGNOSIS — I10 PRIMARY HYPERTENSION: ICD-10-CM

## 2025-08-19 RX ORDER — LISINOPRIL 10 MG/1
10 TABLET ORAL DAILY
Qty: 90 TABLET | Refills: 1 | Status: SHIPPED | OUTPATIENT
Start: 2025-08-19